# Patient Record
Sex: FEMALE | Race: BLACK OR AFRICAN AMERICAN | NOT HISPANIC OR LATINO | URBAN - METROPOLITAN AREA
[De-identification: names, ages, dates, MRNs, and addresses within clinical notes are randomized per-mention and may not be internally consistent; named-entity substitution may affect disease eponyms.]

---

## 2017-05-01 ENCOUNTER — EMERGENCY (EMERGENCY)
Facility: HOSPITAL | Age: 57
LOS: 1 days | Discharge: HOME | End: 2017-05-01
Attending: EMERGENCY MEDICINE | Admitting: EMERGENCY MEDICINE
Payer: COMMERCIAL

## 2017-05-01 VITALS
TEMPERATURE: 99 F | DIASTOLIC BLOOD PRESSURE: 81 MMHG | HEIGHT: 62 IN | HEART RATE: 109 BPM | OXYGEN SATURATION: 100 % | SYSTOLIC BLOOD PRESSURE: 147 MMHG | RESPIRATION RATE: 20 BRPM

## 2017-05-01 VITALS — TEMPERATURE: 99 F | HEART RATE: 90 BPM

## 2017-05-01 DIAGNOSIS — Z79.899 OTHER LONG TERM (CURRENT) DRUG THERAPY: ICD-10-CM

## 2017-05-01 DIAGNOSIS — M25.511 PAIN IN RIGHT SHOULDER: ICD-10-CM

## 2017-05-01 DIAGNOSIS — I10 ESSENTIAL (PRIMARY) HYPERTENSION: ICD-10-CM

## 2017-05-01 PROCEDURE — 99283 EMERGENCY DEPT VISIT LOW MDM: CPT

## 2017-05-01 RX ORDER — DEXAMETHASONE 0.5 MG/5ML
10 ELIXIR ORAL ONCE
Qty: 0 | Refills: 0 | Status: COMPLETED | OUTPATIENT
Start: 2017-05-01 | End: 2017-05-01

## 2017-05-01 RX ORDER — ACETAMINOPHEN 500 MG
975 TABLET ORAL ONCE
Qty: 0 | Refills: 0 | Status: COMPLETED | OUTPATIENT
Start: 2017-05-01 | End: 2017-05-02

## 2017-05-01 RX ORDER — IBUPROFEN 200 MG
400 TABLET ORAL ONCE
Qty: 0 | Refills: 0 | Status: COMPLETED | OUTPATIENT
Start: 2017-05-01 | End: 2017-05-01

## 2017-05-01 NOTE — ED ADULT NURSE NOTE - OBJECTIVE STATEMENT
56 yr old female coming in with right shoulder pain x1 month. Patient has full rom but has discomfort in the shoulder. No visible deformities. No numbness/tingling of arm/hands. Strong equal hand grasp.

## 2017-05-02 PROCEDURE — 99283 EMERGENCY DEPT VISIT LOW MDM: CPT

## 2017-05-02 RX ADMIN — Medication 10 MILLIGRAM(S): at 00:09

## 2017-05-02 RX ADMIN — Medication 975 MILLIGRAM(S): at 00:10

## 2017-05-02 RX ADMIN — Medication 400 MILLIGRAM(S): at 00:09

## 2017-05-02 NOTE — ED PROVIDER NOTE - PROGRESS NOTE DETAILS
Patients symptoms have improved. In bed and comfortable. Lengthy discussion regarding treatment, discharge instructions, and need for follow up. Patient understands and wishes to go home. PARAG Thapa MD

## 2017-05-02 NOTE — ED PROVIDER NOTE - MEDICAL DECISION MAKING DETAILS
56 year old F h/o L shoulder "problems" and herniated cervical discs presenting with R shoulder pain x 1 mo worse over last few days at work lifting and moving boxes. "I over used it."   Has taken 1 naproxen for pain, with minimal relief. no numbness or weakness.  likely rotator cuff or shoulder impingement injury. plan for NSAIDs, steroids, tylenol and out patient f/u.

## 2017-05-02 NOTE — ED PROVIDER NOTE - PHYSICAL EXAMINATION
Well Appearing, Nontoxic, NAD;  Symm Facies, PERRL 3mm, (-)Pallor, Anicteric, MMM;   Abd soft, nt/nd, +bs;  No CVAT;  No edema/calf tender;  No rash;  AOX3, Normal speech  limited range of motion of R shoulder - unable raise shoulder above head. mild tender to palpation anterior shoulder. soft compartments, neurovascularly intact. Good distal pulses.

## 2018-01-11 ENCOUNTER — HOSPITAL ENCOUNTER (EMERGENCY)
Facility: HOSPITAL | Age: 58
Discharge: HOME | End: 2018-01-11
Attending: EMERGENCY MEDICINE

## 2018-01-11 VITALS
SYSTOLIC BLOOD PRESSURE: 136 MMHG | OXYGEN SATURATION: 100 % | TEMPERATURE: 98 F | HEIGHT: 66 IN | RESPIRATION RATE: 16 BRPM | DIASTOLIC BLOOD PRESSURE: 68 MMHG | HEART RATE: 66 BPM | WEIGHT: 150 LBS | BODY MASS INDEX: 24.11 KG/M2

## 2018-01-11 DIAGNOSIS — K44.9 HIATAL HERNIA: Primary | ICD-10-CM

## 2018-01-11 DIAGNOSIS — K21.9 GASTROESOPHAGEAL REFLUX DISEASE WITHOUT ESOPHAGITIS: ICD-10-CM

## 2018-01-11 LAB
ALBUMIN SERPL BCP-MCNC: 3.5 G/DL
ALP SERPL-CCNC: 142 U/L
ALT SERPL W/O P-5'-P-CCNC: 12 U/L
AMORPH CRY URNS QL MICRO: ABNORMAL
ANION GAP SERPL CALC-SCNC: 8 MMOL/L
AST SERPL-CCNC: 13 U/L
B-HCG UR QL: NEGATIVE
BACTERIA #/AREA URNS HPF: ABNORMAL /HPF
BASOPHILS # BLD AUTO: 0.05 K/UL
BASOPHILS NFR BLD: 0.5 %
BILIRUB SERPL-MCNC: 0.3 MG/DL
BILIRUB UR QL STRIP: NEGATIVE
BUN SERPL-MCNC: 19 MG/DL
CALCIUM SERPL-MCNC: 9.2 MG/DL
CHLORIDE SERPL-SCNC: 109 MMOL/L
CLARITY UR: ABNORMAL
CO2 SERPL-SCNC: 25 MMOL/L
COLOR UR: YELLOW
CREAT SERPL-MCNC: 0.8 MG/DL
CTP QC/QA: YES
DIFFERENTIAL METHOD: ABNORMAL
EOSINOPHIL # BLD AUTO: 0.5 K/UL
EOSINOPHIL NFR BLD: 4.5 %
ERYTHROCYTE [DISTWIDTH] IN BLOOD BY AUTOMATED COUNT: 13.5 %
EST. GFR  (AFRICAN AMERICAN): >60 ML/MIN/1.73 M^2
EST. GFR  (NON AFRICAN AMERICAN): >60 ML/MIN/1.73 M^2
GLUCOSE SERPL-MCNC: 109 MG/DL
GLUCOSE UR QL STRIP: NEGATIVE
HCT VFR BLD AUTO: 35 %
HGB BLD-MCNC: 11.7 G/DL
HGB UR QL STRIP: NEGATIVE
KETONES UR QL STRIP: NEGATIVE
LEUKOCYTE ESTERASE UR QL STRIP: ABNORMAL
LIPASE SERPL-CCNC: 40 U/L
LYMPHOCYTES # BLD AUTO: 3.1 K/UL
LYMPHOCYTES NFR BLD: 30.8 %
MCH RBC QN AUTO: 32 PG
MCHC RBC AUTO-ENTMCNC: 33.4 G/DL
MCV RBC AUTO: 96 FL
MICROSCOPIC COMMENT: ABNORMAL
MONOCYTES # BLD AUTO: 0.9 K/UL
MONOCYTES NFR BLD: 9.2 %
NEUTROPHILS # BLD AUTO: 5.5 K/UL
NEUTROPHILS NFR BLD: 55 %
NITRITE UR QL STRIP: NEGATIVE
PH UR STRIP: 5 [PH] (ref 5–8)
PLATELET # BLD AUTO: 315 K/UL
PMV BLD AUTO: 9.5 FL
POTASSIUM SERPL-SCNC: 3.7 MMOL/L
PROT SERPL-MCNC: 7.3 G/DL
PROT UR QL STRIP: NEGATIVE
RBC # BLD AUTO: 3.66 M/UL
RBC #/AREA URNS HPF: 4 /HPF (ref 0–4)
SODIUM SERPL-SCNC: 142 MMOL/L
SP GR UR STRIP: 1.02 (ref 1–1.03)
SQUAMOUS #/AREA URNS HPF: 20 /HPF
TROPONIN I SERPL DL<=0.01 NG/ML-MCNC: <0.006 NG/ML
URN SPEC COLLECT METH UR: ABNORMAL
UROBILINOGEN UR STRIP-ACNC: ABNORMAL EU/DL
WBC # BLD AUTO: 10.07 K/UL
WBC #/AREA URNS HPF: 3 /HPF (ref 0–5)

## 2018-01-11 PROCEDURE — 25000003 PHARM REV CODE 250: Performed by: EMERGENCY MEDICINE

## 2018-01-11 PROCEDURE — 81025 URINE PREGNANCY TEST: CPT | Performed by: EMERGENCY MEDICINE

## 2018-01-11 PROCEDURE — 96361 HYDRATE IV INFUSION ADD-ON: CPT

## 2018-01-11 PROCEDURE — 84484 ASSAY OF TROPONIN QUANT: CPT

## 2018-01-11 PROCEDURE — 25500020 PHARM REV CODE 255: Performed by: EMERGENCY MEDICINE

## 2018-01-11 PROCEDURE — 80053 COMPREHEN METABOLIC PANEL: CPT

## 2018-01-11 PROCEDURE — 96360 HYDRATION IV INFUSION INIT: CPT

## 2018-01-11 PROCEDURE — 81000 URINALYSIS NONAUTO W/SCOPE: CPT

## 2018-01-11 PROCEDURE — 83690 ASSAY OF LIPASE: CPT

## 2018-01-11 PROCEDURE — 85025 COMPLETE CBC W/AUTO DIFF WBC: CPT

## 2018-01-11 PROCEDURE — 99284 EMERGENCY DEPT VISIT MOD MDM: CPT | Mod: 25

## 2018-01-11 RX ORDER — MAG HYDROX/ALUMINUM HYD/SIMETH 200-200-20
15 SUSPENSION, ORAL (FINAL DOSE FORM) ORAL
Qty: 150 ML | Refills: 0 | Status: SHIPPED | OUTPATIENT
Start: 2018-01-11 | End: 2019-01-11

## 2018-01-11 RX ORDER — SODIUM CHLORIDE 9 MG/ML
1000 INJECTION, SOLUTION INTRAVENOUS
Status: COMPLETED | OUTPATIENT
Start: 2018-01-11 | End: 2018-01-11

## 2018-01-11 RX ADMIN — IOHEXOL 75 ML: 350 INJECTION, SOLUTION INTRAVENOUS at 08:01

## 2018-01-11 RX ADMIN — SODIUM CHLORIDE 1000 ML: 0.9 INJECTION, SOLUTION INTRAVENOUS at 07:01

## 2018-01-11 NOTE — DISCHARGE INSTRUCTIONS
Avoid spicy foods.  Use medication as prescribed.  It is very important to make an appointment to see your primary physician as soon as possible.

## 2018-01-11 NOTE — ED NOTES
Explained to patient reason for delay as no beds available; verbalized understanding; gave our apologizes

## 2018-01-11 NOTE — ED PROVIDER NOTES
"Encounter Date: 1/11/2018    SCRIBE #1 NOTE: I, Parminder Radha, am scribing for, and in the presence of,  Luis E Gimenez MD. I have scribed the following portions of the note - Other sections scribed: HPI and ROS.       History     Chief Complaint   Patient presents with    Abdominal Pain     Pt reports "I have a knot in my stomach" that started today.       Chief Complaint: Abdominal Pain    HPI: This 57 y.o. Female with HTN presents to the ED c/o acute onset supra umbilical abdominal pain. Symptoms began last night immediately after consuming "spicy" chicken while at work. Patient reports an associated "knot" noticed region abdomen as well. Pain was severe initially and radiated to the right side abdomen. However, she states pain has since resolved. There's no attempted treatment. Last BM was yesterday morning. At this time, she denies fever, chills, nausea, vomiting, diarrhea, blood in stool or dysuria.       The history is provided by the patient. No  was used.     Review of patient's allergies indicates:  No Known Allergies  Past Medical History:   Diagnosis Date    Constipation     Hypertension      Past Surgical History:   Procedure Laterality Date    TUBAL LIGATION       History reviewed. No pertinent family history.  Social History   Substance Use Topics    Smoking status: Current Every Day Smoker     Packs/day: 0.50     Years: 20.00     Types: Cigarettes    Smokeless tobacco: Never Used    Alcohol use Yes      Comment: 4 cans beer a day X20 yrs     Review of Systems   Constitutional: Negative for chills and fever.   HENT: Negative for ear pain and sore throat.    Eyes: Negative for pain.   Respiratory: Negative for cough and shortness of breath.    Cardiovascular: Negative for chest pain.   Gastrointestinal: Positive for abdominal pain. Negative for diarrhea, nausea and vomiting.   Genitourinary: Negative for dysuria and hematuria.   Musculoskeletal: Negative for myalgias (arm or " leg pain).   Skin: Negative for rash.   Neurological: Negative for headaches.       Physical Exam     Initial Vitals [01/11/18 0044]   BP Pulse Resp Temp SpO2   (!) 149/76 62 18 97.5 °F (36.4 °C) 99 %      MAP       100.33         Physical Exam    Nursing note and vitals reviewed.  Constitutional: She appears well-developed and well-nourished.  Non-toxic appearance. She does not appear ill.   HENT:   Head: Normocephalic and atraumatic.   Eyes: EOM are normal.   Neck: Neck supple.   Cardiovascular: Normal rate and regular rhythm.   Pulmonary/Chest: Effort normal and breath sounds normal. No respiratory distress.   Abdominal: Soft. Normal appearance and bowel sounds are normal. She exhibits no distension. There is tenderness in the right upper quadrant and right lower quadrant. There is no rebound and no guarding.   Right sided abdominal tenderness to palpation.     There's a soft non tenderness easily reducible supra umbilical hernia.    Musculoskeletal: Normal range of motion.   Neurological: She is alert.   Skin: Skin is warm and dry.   Psychiatric: She has a normal mood and affect.         ED Course   Procedures  Labs Reviewed   CBC W/ AUTO DIFFERENTIAL - Abnormal; Notable for the following:        Result Value    RBC 3.66 (*)     Hemoglobin 11.7 (*)     Hematocrit 35.0 (*)     MCH 32.0 (*)     All other components within normal limits   COMPREHENSIVE METABOLIC PANEL - Abnormal; Notable for the following:     Alkaline Phosphatase 142 (*)     All other components within normal limits   URINALYSIS - Abnormal; Notable for the following:     Appearance, UA Hazy (*)     Urobilinogen, UA 2.0-3.0 (*)     Leukocytes, UA Trace (*)     All other components within normal limits   URINALYSIS MICROSCOPIC - Abnormal; Notable for the following:     Bacteria, UA Moderate (*)     All other components within normal limits   LIPASE   TROPONIN I   POCT URINE PREGNANCY             Medical Decision Making:   History:   Old Medical  Records: I decided to obtain old medical records.  Initial Assessment:   Abdominal pain  Differential Diagnosis:   GERD  Gastritis  Appendicits  Cholecystits  Diverticultiis   Clinical Tests:   Lab Tests: Ordered and Reviewed  Radiological Study: Ordered and Reviewed  ED Management:  Patient afebrile, no acute distress.  At time of H&P patient reports abdominal pain had resolved.  On physical exam she did have some right sided tenderness and a soft easily reducible supra umbilical hernia.  CT scan negative for appendicitis.  Does demonstrate fibroid uterus, nonspecific liver enlargement and hiatal hernia.  No evidence of appendicitis.  Patient reports symptoms resolved in the emergency room.  Started on Zantac and Maalox and referred to PMD. Counseled on supportive care and appropriate medication usage. Dicussed extensively concerning symptoms for which to return to ER and the importance of follow up . Patient expressed understanding and agreement with treatment plan.             Scribe Attestation:   Scribe #1: I performed the above scribed service and the documentation accurately describes the services I performed. I attest to the accuracy of the note.    Attending Attestation:           Physician Attestation for Scribe:  Physician Attestation Statement for Scribe #1: I, Luis E Gimenez MD, reviewed documentation, as scribed by Parminder Garcia in my presence, and it is both accurate and complete.                 ED Course      Clinical Impression:   The primary encounter diagnosis was Hiatal hernia. A diagnosis of Gastroesophageal reflux disease without esophagitis was also pertinent to this visit.    Disposition:   Disposition: Discharged  Condition: Stable                        Luis E Gimenez MD  01/12/18 2992

## 2020-03-30 ENCOUNTER — INPATIENT (INPATIENT)
Facility: HOSPITAL | Age: 60
LOS: 3 days | Discharge: HOME | End: 2020-04-03
Attending: INTERNAL MEDICINE | Admitting: INTERNAL MEDICINE
Payer: COMMERCIAL

## 2020-03-30 VITALS
SYSTOLIC BLOOD PRESSURE: 125 MMHG | HEART RATE: 115 BPM | HEIGHT: 62 IN | OXYGEN SATURATION: 84 % | WEIGHT: 214.95 LBS | DIASTOLIC BLOOD PRESSURE: 61 MMHG | TEMPERATURE: 100 F | RESPIRATION RATE: 21 BRPM

## 2020-03-30 PROBLEM — I10 ESSENTIAL (PRIMARY) HYPERTENSION: Chronic | Status: ACTIVE | Noted: 2017-05-01

## 2020-03-30 PROBLEM — R73.03 PREDIABETES: Chronic | Status: ACTIVE | Noted: 2017-05-01

## 2020-03-30 LAB
ALBUMIN SERPL ELPH-MCNC: 3.3 G/DL — SIGNIFICANT CHANGE UP (ref 3.3–5)
ALP SERPL-CCNC: 105 U/L — SIGNIFICANT CHANGE UP (ref 40–120)
ALT FLD-CCNC: 29 U/L — SIGNIFICANT CHANGE UP (ref 12–78)
ANION GAP SERPL CALC-SCNC: 5 MMOL/L — SIGNIFICANT CHANGE UP (ref 5–17)
APPEARANCE UR: CLEAR — SIGNIFICANT CHANGE UP
APTT BLD: 36.2 SEC — SIGNIFICANT CHANGE UP (ref 28.5–37)
AST SERPL-CCNC: 36 U/L — SIGNIFICANT CHANGE UP (ref 15–37)
BACTERIA # UR AUTO: ABNORMAL
BASOPHILS # BLD AUTO: 0.01 K/UL — SIGNIFICANT CHANGE UP (ref 0–0.2)
BASOPHILS NFR BLD AUTO: 0.2 % — SIGNIFICANT CHANGE UP (ref 0–2)
BILIRUB SERPL-MCNC: 0.4 MG/DL — SIGNIFICANT CHANGE UP (ref 0.2–1.2)
BILIRUB UR-MCNC: NEGATIVE — SIGNIFICANT CHANGE UP
BUN SERPL-MCNC: 9 MG/DL — SIGNIFICANT CHANGE UP (ref 7–23)
CALCIUM SERPL-MCNC: 8.7 MG/DL — SIGNIFICANT CHANGE UP (ref 8.5–10.1)
CHLORIDE SERPL-SCNC: 99 MMOL/L — SIGNIFICANT CHANGE UP (ref 96–108)
CO2 SERPL-SCNC: 30 MMOL/L — SIGNIFICANT CHANGE UP (ref 22–31)
COLOR SPEC: YELLOW — SIGNIFICANT CHANGE UP
CREAT SERPL-MCNC: 1.04 MG/DL — SIGNIFICANT CHANGE UP (ref 0.5–1.3)
DIFF PNL FLD: ABNORMAL
EOSINOPHIL # BLD AUTO: 0 K/UL — SIGNIFICANT CHANGE UP (ref 0–0.5)
EOSINOPHIL NFR BLD AUTO: 0 % — SIGNIFICANT CHANGE UP (ref 0–6)
EPI CELLS # UR: ABNORMAL
FLU A RESULT: SIGNIFICANT CHANGE UP
FLU A RESULT: SIGNIFICANT CHANGE UP
FLUAV AG NPH QL: SIGNIFICANT CHANGE UP
FLUBV AG NPH QL: SIGNIFICANT CHANGE UP
GLUCOSE SERPL-MCNC: 155 MG/DL — HIGH (ref 70–99)
GLUCOSE UR QL: NEGATIVE MG/DL — SIGNIFICANT CHANGE UP
HCT VFR BLD CALC: 39.7 % — SIGNIFICANT CHANGE UP (ref 34.5–45)
HGB BLD-MCNC: 12.7 G/DL — SIGNIFICANT CHANGE UP (ref 11.5–15.5)
IMM GRANULOCYTES NFR BLD AUTO: 0.6 % — SIGNIFICANT CHANGE UP (ref 0–1.5)
INR BLD: 1.04 RATIO — SIGNIFICANT CHANGE UP (ref 0.88–1.16)
KETONES UR-MCNC: NEGATIVE — SIGNIFICANT CHANGE UP
LEUKOCYTE ESTERASE UR-ACNC: ABNORMAL
LYMPHOCYTES # BLD AUTO: 1.42 K/UL — SIGNIFICANT CHANGE UP (ref 1–3.3)
LYMPHOCYTES # BLD AUTO: 28.5 % — SIGNIFICANT CHANGE UP (ref 13–44)
MCHC RBC-ENTMCNC: 27.9 PG — SIGNIFICANT CHANGE UP (ref 27–34)
MCHC RBC-ENTMCNC: 32 GM/DL — SIGNIFICANT CHANGE UP (ref 32–36)
MCV RBC AUTO: 87.3 FL — SIGNIFICANT CHANGE UP (ref 80–100)
MONOCYTES # BLD AUTO: 0.44 K/UL — SIGNIFICANT CHANGE UP (ref 0–0.9)
MONOCYTES NFR BLD AUTO: 8.8 % — SIGNIFICANT CHANGE UP (ref 2–14)
NEUTROPHILS # BLD AUTO: 3.09 K/UL — SIGNIFICANT CHANGE UP (ref 1.8–7.4)
NEUTROPHILS NFR BLD AUTO: 61.9 % — SIGNIFICANT CHANGE UP (ref 43–77)
NITRITE UR-MCNC: NEGATIVE — SIGNIFICANT CHANGE UP
NRBC # BLD: 0 /100 WBCS — SIGNIFICANT CHANGE UP (ref 0–0)
PH UR: 6 — SIGNIFICANT CHANGE UP (ref 5–8)
PLATELET # BLD AUTO: 263 K/UL — SIGNIFICANT CHANGE UP (ref 150–400)
POTASSIUM SERPL-MCNC: 4.2 MMOL/L — SIGNIFICANT CHANGE UP (ref 3.5–5.3)
POTASSIUM SERPL-SCNC: 4.2 MMOL/L — SIGNIFICANT CHANGE UP (ref 3.5–5.3)
PROT SERPL-MCNC: 8.2 GM/DL — SIGNIFICANT CHANGE UP (ref 6–8.3)
PROT UR-MCNC: 15 MG/DL
PROTHROM AB SERPL-ACNC: 11.7 SEC — SIGNIFICANT CHANGE UP (ref 10–12.9)
RBC # BLD: 4.55 M/UL — SIGNIFICANT CHANGE UP (ref 3.8–5.2)
RBC # FLD: 13.8 % — SIGNIFICANT CHANGE UP (ref 10.3–14.5)
RBC CASTS # UR COMP ASSIST: SIGNIFICANT CHANGE UP /HPF (ref 0–4)
RSV RESULT: SIGNIFICANT CHANGE UP
RSV RNA RESP QL NAA+PROBE: SIGNIFICANT CHANGE UP
SODIUM SERPL-SCNC: 134 MMOL/L — LOW (ref 135–145)
SP GR SPEC: 1.01 — SIGNIFICANT CHANGE UP (ref 1.01–1.02)
UROBILINOGEN FLD QL: NEGATIVE MG/DL — SIGNIFICANT CHANGE UP
WBC # BLD: 4.99 K/UL — SIGNIFICANT CHANGE UP (ref 3.8–10.5)
WBC # FLD AUTO: 4.99 K/UL — SIGNIFICANT CHANGE UP (ref 3.8–10.5)
WBC UR QL: >50

## 2020-03-30 PROCEDURE — 99223 1ST HOSP IP/OBS HIGH 75: CPT

## 2020-03-30 PROCEDURE — 93010 ELECTROCARDIOGRAM REPORT: CPT

## 2020-03-30 PROCEDURE — 99284 EMERGENCY DEPT VISIT MOD MDM: CPT

## 2020-03-30 PROCEDURE — 71045 X-RAY EXAM CHEST 1 VIEW: CPT | Mod: 26

## 2020-03-30 RX ORDER — ENOXAPARIN SODIUM 100 MG/ML
40 INJECTION SUBCUTANEOUS ONCE
Refills: 0 | Status: COMPLETED | OUTPATIENT
Start: 2020-03-30 | End: 2020-03-30

## 2020-03-30 RX ORDER — HYDROXYCHLOROQUINE SULFATE 200 MG
TABLET ORAL
Refills: 0 | Status: DISCONTINUED | OUTPATIENT
Start: 2020-03-30 | End: 2020-04-03

## 2020-03-30 RX ORDER — AZITHROMYCIN 500 MG/1
500 TABLET, FILM COATED ORAL ONCE
Refills: 0 | Status: COMPLETED | OUTPATIENT
Start: 2020-03-30 | End: 2020-03-30

## 2020-03-30 RX ORDER — HYDROXYCHLOROQUINE SULFATE 200 MG
400 TABLET ORAL EVERY 12 HOURS
Refills: 0 | Status: COMPLETED | OUTPATIENT
Start: 2020-03-30 | End: 2020-03-31

## 2020-03-30 RX ORDER — ENOXAPARIN SODIUM 100 MG/ML
100 INJECTION SUBCUTANEOUS ONCE
Refills: 0 | Status: DISCONTINUED | OUTPATIENT
Start: 2020-03-30 | End: 2020-03-30

## 2020-03-30 RX ORDER — AMLODIPINE BESYLATE 2.5 MG/1
5 TABLET ORAL DAILY
Refills: 0 | Status: DISCONTINUED | OUTPATIENT
Start: 2020-03-30 | End: 2020-04-03

## 2020-03-30 RX ORDER — ALBUTEROL 90 UG/1
2 AEROSOL, METERED ORAL ONCE
Refills: 0 | Status: COMPLETED | OUTPATIENT
Start: 2020-03-30 | End: 2020-03-30

## 2020-03-30 RX ORDER — HYDROXYCHLOROQUINE SULFATE 200 MG
200 TABLET ORAL EVERY 12 HOURS
Refills: 0 | Status: DISCONTINUED | OUTPATIENT
Start: 2020-03-31 | End: 2020-04-03

## 2020-03-30 RX ADMIN — ALBUTEROL 2 PUFF(S): 90 AEROSOL, METERED ORAL at 14:02

## 2020-03-30 RX ADMIN — Medication 400 MILLIGRAM(S): at 15:33

## 2020-03-30 RX ADMIN — AZITHROMYCIN 500 MILLIGRAM(S): 500 TABLET, FILM COATED ORAL at 15:32

## 2020-03-30 RX ADMIN — Medication 80 MILLIGRAM(S): at 15:32

## 2020-03-30 RX ADMIN — ENOXAPARIN SODIUM 40 MILLIGRAM(S): 100 INJECTION SUBCUTANEOUS at 15:32

## 2020-03-30 NOTE — ED ADULT TRIAGE NOTE - CHIEF COMPLAINT QUOTE
patient c/o of fever, difficulty breathing started and cough last week , c/o of back pain denied chest pain , denied abdominal pain no N/V no diarrhea at the time of triage

## 2020-03-30 NOTE — H&P ADULT - ASSESSMENT
59 years old female wit h probable covid         IMPROVE VTE Individual Risk Assessment    RISK                                                          Points  [] Previous VTE                                           3  [] Thrombophilia                                        2  [] Lower limb paralysis                              2   [] Current Cancer                                       2   [] Immobilization > 24 hrs                        1  [] ICU/CCU stay > 24 hours                       1  [] Age > 60                                                   1    IMPROVE VTE Score:0

## 2020-03-30 NOTE — H&P ADULT - NSHPPHYSICALEXAM_GEN_ALL_CORE
ICU Vital Signs Last 24 Hrs  T(C): 37.5 (30 Mar 2020 12:29), Max: 37.5 (30 Mar 2020 12:29)  T(F): 99.5 (30 Mar 2020 12:29), Max: 99.5 (30 Mar 2020 12:29)  HR: 96 (30 Mar 2020 14:15) (96 - 115)  BP: 125/61 (30 Mar 2020 12:29) (125/61 - 125/61)  BP(mean): --  ABP: --  ABP(mean): --  RR: 17 (30 Mar 2020 14:15) (17 - 21)  SpO2: 97% (30 Mar 2020 14:15) (84% - 97%)  GENERAL: NAD well-developed  HEAD:  Atraumatic, Normocephalic  EYES: EOMI, PERRLA, conjunctiva and sclera clear  ENMT: No tonsillar erythema, exudates, or enlargement; Moist mucous membranes, Good dentition, No lesions  NECK: Supple, No JVD, Normal thyroid  NERVOUS SYSTEM:  Alert & Oriented X3, Good concentration; Motor Strength 5/5 B/L upper and lower extremities; DTRs 2+ intact and symmetric  CHEST/LUNG: Clear to percussion bilaterally; No rales, rhonchi, wheezing, or rubs  HEART: Regular rate and rhythm; No murmurs, rubs, or gallops  ABDOMEN: Soft, Nontender, Nondistended; Bowel sounds present  EXTREMITIES:  2+ Peripheral Pulses, No clubbing, cyanosis, or edema  LYMPH: No lymphadenopathy   SKIN: No rashes or lesions

## 2020-03-30 NOTE — ED PROVIDER NOTE - OBJECTIVE STATEMENT
59 year old female pmh htn presents with sob fever body aches for 1 week. worsened over past day. sating 84% on RA. improved on NC

## 2020-03-31 DIAGNOSIS — B97.29 OTHER CORONAVIRUS AS THE CAUSE OF DISEASES CLASSIFIED ELSEWHERE: ICD-10-CM

## 2020-03-31 DIAGNOSIS — J12.9 VIRAL PNEUMONIA, UNSPECIFIED: ICD-10-CM

## 2020-03-31 DIAGNOSIS — I10 ESSENTIAL (PRIMARY) HYPERTENSION: ICD-10-CM

## 2020-03-31 LAB
CULTURE RESULTS: SIGNIFICANT CHANGE UP
SPECIMEN SOURCE: SIGNIFICANT CHANGE UP

## 2020-03-31 PROCEDURE — 99232 SBSQ HOSP IP/OBS MODERATE 35: CPT

## 2020-03-31 RX ORDER — ASCORBIC ACID 60 MG
500 TABLET,CHEWABLE ORAL DAILY
Refills: 0 | Status: DISCONTINUED | OUTPATIENT
Start: 2020-03-31 | End: 2020-04-03

## 2020-03-31 RX ORDER — ATORVASTATIN CALCIUM 80 MG/1
10 TABLET, FILM COATED ORAL AT BEDTIME
Refills: 0 | Status: DISCONTINUED | OUTPATIENT
Start: 2020-03-31 | End: 2020-04-03

## 2020-03-31 RX ORDER — DEXTROSE 50 % IN WATER 50 %
25 SYRINGE (ML) INTRAVENOUS ONCE
Refills: 0 | Status: DISCONTINUED | OUTPATIENT
Start: 2020-03-31 | End: 2020-04-03

## 2020-03-31 RX ORDER — ATAZANAVIR 200 MG/1
300 CAPSULE ORAL DAILY
Refills: 0 | Status: DISCONTINUED | OUTPATIENT
Start: 2020-03-31 | End: 2020-03-31

## 2020-03-31 RX ORDER — DEXTROSE 50 % IN WATER 50 %
15 SYRINGE (ML) INTRAVENOUS ONCE
Refills: 0 | Status: DISCONTINUED | OUTPATIENT
Start: 2020-03-31 | End: 2020-04-03

## 2020-03-31 RX ORDER — SODIUM CHLORIDE 9 MG/ML
1000 INJECTION, SOLUTION INTRAVENOUS
Refills: 0 | Status: DISCONTINUED | OUTPATIENT
Start: 2020-03-31 | End: 2020-04-03

## 2020-03-31 RX ORDER — DEXTROSE 50 % IN WATER 50 %
12.5 SYRINGE (ML) INTRAVENOUS ONCE
Refills: 0 | Status: DISCONTINUED | OUTPATIENT
Start: 2020-03-31 | End: 2020-04-03

## 2020-03-31 RX ORDER — ZINC SULFATE TAB 220 MG (50 MG ZINC EQUIVALENT) 220 (50 ZN) MG
220 TAB ORAL DAILY
Refills: 0 | Status: DISCONTINUED | OUTPATIENT
Start: 2020-03-31 | End: 2020-04-03

## 2020-03-31 RX ORDER — AMLODIPINE BESYLATE 2.5 MG/1
0 TABLET ORAL
Qty: 0 | Refills: 0 | DISCHARGE

## 2020-03-31 RX ORDER — GLUCAGON INJECTION, SOLUTION 0.5 MG/.1ML
1 INJECTION, SOLUTION SUBCUTANEOUS ONCE
Refills: 0 | Status: DISCONTINUED | OUTPATIENT
Start: 2020-03-31 | End: 2020-04-03

## 2020-03-31 RX ORDER — ENOXAPARIN SODIUM 100 MG/ML
40 INJECTION SUBCUTANEOUS DAILY
Refills: 0 | Status: DISCONTINUED | OUTPATIENT
Start: 2020-03-31 | End: 2020-04-01

## 2020-03-31 RX ORDER — ATAZANAVIR 200 MG/1
300 CAPSULE ORAL DAILY
Refills: 0 | Status: DISCONTINUED | OUTPATIENT
Start: 2020-03-31 | End: 2020-04-03

## 2020-03-31 RX ORDER — INSULIN LISPRO 100/ML
VIAL (ML) SUBCUTANEOUS
Refills: 0 | Status: DISCONTINUED | OUTPATIENT
Start: 2020-03-31 | End: 2020-04-03

## 2020-03-31 RX ADMIN — Medication 200 MILLIGRAM(S): at 15:56

## 2020-03-31 RX ADMIN — Medication 400 MILLIGRAM(S): at 05:35

## 2020-03-31 RX ADMIN — ATAZANAVIR 300 MILLIGRAM(S): 200 CAPSULE ORAL at 12:01

## 2020-03-31 RX ADMIN — Medication 500 MILLIGRAM(S): at 12:01

## 2020-03-31 RX ADMIN — ZINC SULFATE TAB 220 MG (50 MG ZINC EQUIVALENT) 220 MILLIGRAM(S): 220 (50 ZN) TAB at 12:01

## 2020-03-31 NOTE — PROGRESS NOTE ADULT - ASSESSMENT
59 years old female wit history of HTN, HLD & DM II who p/w fever, body aches & hypoxia.   - patient likely has COVID, test pending  - patient sating 84% on RA. improved on NC  - flu & RSV negative  - CXR showed nonspecific mild increased interstitial lung markings with airspace opacity right lung base  - patient started on prophylactic Plaquenil and Reyataz  - NGTD on cultures    Essential hypertension  - c/w Norvasc and enalapril     DM II  - start ISS    HLD  - c/w Lipitor & enalapril     Prophylaxis:  DVT: Lovenox  GI: PO diet

## 2020-03-31 NOTE — PROGRESS NOTE ADULT - SUBJECTIVE AND OBJECTIVE BOX
59 years old female wit history of HTN, HLD & DM II who p/w fever, body aches & hypoxia. She is lying in bed in NAD.    MEDICATIONS  (STANDING):  amLODIPine   Tablet 5 milliGRAM(s) Oral daily  ascorbic acid 500 milliGRAM(s) Oral daily  atazanavir 300 milliGRAM(s) Oral daily  enalapril 5 milliGRAM(s) Oral daily  hydroxychloroquine 200 milliGRAM(s) Oral every 12 hours  hydroxychloroquine   Oral   zinc sulfate 220 milliGRAM(s) Oral daily    MEDICATIONS  (PRN):    Allergies    No Known Allergies    Intolerances    Vital Signs Last 24 Hrs  T(C): 37.1 (31 Mar 2020 17:10), Max: 37.1 (31 Mar 2020 17:10)  T(F): 98.8 (31 Mar 2020 17:10), Max: 98.8 (31 Mar 2020 17:10)  HR: 93 (31 Mar 2020 17:10) (85 - 98)  BP: 105/54 (31 Mar 2020 17:10) (105/54 - 132/70)  RR: 16 (31 Mar 2020 17:10) (16 - 18)  SpO2: 95% (31 Mar 2020 17:10) (93% - 98%)    PHYSICAL EXAM:  GENERAL: NAD, well-groomed, well-developed  HEAD:  Atraumatic, Normocephalic  EYES: EOMI, PERRLA   NECK: Supple   NERVOUS SYSTEM:  Alert   CHEST/LUNG: Clear to auscultation bilaterally; No rales, rhonchi, wheezing, or rubs  HEART: Regular rate and rhythm; No murmurs, rubs, or gallops  ABDOMEN: Soft, Nontender, Nondistended; Bowel sounds present  EXTREMITIES: No clubbing, cyanosis, or edema    LABS:                        12.7   4.99  )-----------( 263      ( 30 Mar 2020 14:06 )             39.7     03-30    134<L>  |  99  |  9   ----------------------------<  155<H>  4.2   |  30  |  1.04    Ca    8.7      30 Mar 2020 14:06    TPro  8.2  /  Alb  3.3  /  TBili  0.4  /  DBili  x   /  AST  36  /  ALT  29  /  AlkPhos  105  03-30    PT/INR - ( 30 Mar 2020 14:06 )   PT: 11.7 sec;   INR: 1.04 ratio      PTT - ( 30 Mar 2020 14:06 )  PTT:36.2 sec  Urinalysis Basic - ( 30 Mar 2020 17:28 )    Color: x / Appearance: x / SG: x / pH: x  Gluc: x / Ketone: x  / Bili: x / Urobili: x   Blood: x / Protein: x / Nitrite: x   Leuk Esterase: x / RBC: 0-2 /HPF / WBC >50   Sq Epi: x / Non Sq Epi: Many / Bacteria: Many     Culture - Urine (collected 31 Mar 2020 00:33)  Source: .Urine Clean Catch (Midstream)  Final Report (31 Mar 2020 20:40):    <10,000 CFU/mL Normal Urogenital Radha    Culture - Blood (collected 30 Mar 2020 17:22)  Source: .Blood Blood-Peripheral  Preliminary Report (31 Mar 2020 18:02):    No growth to date.    Culture - Blood (collected 30 Mar 2020 17:00)  Source: .Blood Blood-Peripheral  Preliminary Report (31 Mar 2020 17:02):    No growth to date.

## 2020-04-01 LAB
ALBUMIN SERPL ELPH-MCNC: 2.7 G/DL — LOW (ref 3.3–5)
ALP SERPL-CCNC: 92 U/L — SIGNIFICANT CHANGE UP (ref 40–120)
ALT FLD-CCNC: 21 U/L — SIGNIFICANT CHANGE UP (ref 12–78)
ANION GAP SERPL CALC-SCNC: 7 MMOL/L — SIGNIFICANT CHANGE UP (ref 5–17)
AST SERPL-CCNC: 21 U/L — SIGNIFICANT CHANGE UP (ref 15–37)
BILIRUB SERPL-MCNC: 0.5 MG/DL — SIGNIFICANT CHANGE UP (ref 0.2–1.2)
BUN SERPL-MCNC: 15 MG/DL — SIGNIFICANT CHANGE UP (ref 7–23)
CALCIUM SERPL-MCNC: 8.2 MG/DL — LOW (ref 8.5–10.1)
CHLORIDE SERPL-SCNC: 102 MMOL/L — SIGNIFICANT CHANGE UP (ref 96–108)
CO2 SERPL-SCNC: 30 MMOL/L — SIGNIFICANT CHANGE UP (ref 22–31)
CREAT SERPL-MCNC: 0.88 MG/DL — SIGNIFICANT CHANGE UP (ref 0.5–1.3)
CRP SERPL-MCNC: 5.54 MG/DL — HIGH (ref 0–0.4)
D DIMER BLD IA.RAPID-MCNC: 188 NG/ML DDU — SIGNIFICANT CHANGE UP
ERYTHROCYTE [SEDIMENTATION RATE] IN BLOOD: 50 MM/HR — HIGH (ref 0–20)
GLUCOSE SERPL-MCNC: 139 MG/DL — HIGH (ref 70–99)
HBA1C BLD-MCNC: 7.1 % — HIGH (ref 4–5.6)
HCT VFR BLD CALC: 37.4 % — SIGNIFICANT CHANGE UP (ref 34.5–45)
HCV AB S/CO SERPL IA: 0.11 S/CO — SIGNIFICANT CHANGE UP (ref 0–0.99)
HCV AB SERPL-IMP: SIGNIFICANT CHANGE UP
HGB BLD-MCNC: 11.5 G/DL — SIGNIFICANT CHANGE UP (ref 11.5–15.5)
LDH SERPL L TO P-CCNC: 420 U/L — HIGH (ref 50–242)
MAGNESIUM SERPL-MCNC: 2.1 MG/DL — SIGNIFICANT CHANGE UP (ref 1.6–2.6)
MCHC RBC-ENTMCNC: 27.3 PG — SIGNIFICANT CHANGE UP (ref 27–34)
MCHC RBC-ENTMCNC: 30.7 GM/DL — LOW (ref 32–36)
MCV RBC AUTO: 88.6 FL — SIGNIFICANT CHANGE UP (ref 80–100)
NRBC # BLD: 0 /100 WBCS — SIGNIFICANT CHANGE UP (ref 0–0)
PHOSPHATE SERPL-MCNC: 2.4 MG/DL — LOW (ref 2.5–4.5)
PLATELET # BLD AUTO: 323 K/UL — SIGNIFICANT CHANGE UP (ref 150–400)
POTASSIUM SERPL-MCNC: 4.5 MMOL/L — SIGNIFICANT CHANGE UP (ref 3.5–5.3)
POTASSIUM SERPL-SCNC: 4.5 MMOL/L — SIGNIFICANT CHANGE UP (ref 3.5–5.3)
PROCALCITONIN SERPL-MCNC: 0.07 NG/ML — SIGNIFICANT CHANGE UP (ref 0.02–0.1)
PROT SERPL-MCNC: 6.9 GM/DL — SIGNIFICANT CHANGE UP (ref 6–8.3)
RBC # BLD: 4.22 M/UL — SIGNIFICANT CHANGE UP (ref 3.8–5.2)
RBC # FLD: 13.8 % — SIGNIFICANT CHANGE UP (ref 10.3–14.5)
SARS-COV-2 RNA SPEC QL NAA+PROBE: DETECTED
SODIUM SERPL-SCNC: 139 MMOL/L — SIGNIFICANT CHANGE UP (ref 135–145)
WBC # BLD: 8.79 K/UL — SIGNIFICANT CHANGE UP (ref 3.8–10.5)
WBC # FLD AUTO: 8.79 K/UL — SIGNIFICANT CHANGE UP (ref 3.8–10.5)

## 2020-04-01 PROCEDURE — 99232 SBSQ HOSP IP/OBS MODERATE 35: CPT

## 2020-04-01 RX ORDER — SODIUM,POTASSIUM PHOSPHATES 278-250MG
1 POWDER IN PACKET (EA) ORAL
Refills: 0 | Status: COMPLETED | OUTPATIENT
Start: 2020-04-01 | End: 2020-04-02

## 2020-04-01 RX ORDER — ENOXAPARIN SODIUM 100 MG/ML
40 INJECTION SUBCUTANEOUS EVERY 12 HOURS
Refills: 0 | Status: DISCONTINUED | OUTPATIENT
Start: 2020-04-01 | End: 2020-04-03

## 2020-04-01 RX ADMIN — Medication 1 PACKET(S): at 11:31

## 2020-04-01 RX ADMIN — ZINC SULFATE TAB 220 MG (50 MG ZINC EQUIVALENT) 220 MILLIGRAM(S): 220 (50 ZN) TAB at 11:30

## 2020-04-01 RX ADMIN — Medication 3: at 17:38

## 2020-04-01 RX ADMIN — Medication 500 MILLIGRAM(S): at 11:34

## 2020-04-01 RX ADMIN — ENOXAPARIN SODIUM 40 MILLIGRAM(S): 100 INJECTION SUBCUTANEOUS at 11:34

## 2020-04-01 RX ADMIN — Medication 3: at 11:34

## 2020-04-01 RX ADMIN — AMLODIPINE BESYLATE 5 MILLIGRAM(S): 2.5 TABLET ORAL at 06:40

## 2020-04-01 RX ADMIN — Medication 1 PACKET(S): at 17:38

## 2020-04-01 RX ADMIN — ATORVASTATIN CALCIUM 10 MILLIGRAM(S): 80 TABLET, FILM COATED ORAL at 22:21

## 2020-04-01 RX ADMIN — Medication 1 PACKET(S): at 22:21

## 2020-04-01 RX ADMIN — Medication 5 MILLIGRAM(S): at 06:40

## 2020-04-01 RX ADMIN — ATAZANAVIR 300 MILLIGRAM(S): 200 CAPSULE ORAL at 11:31

## 2020-04-01 RX ADMIN — Medication 200 MILLIGRAM(S): at 17:35

## 2020-04-01 RX ADMIN — ATORVASTATIN CALCIUM 10 MILLIGRAM(S): 80 TABLET, FILM COATED ORAL at 02:21

## 2020-04-01 RX ADMIN — Medication 200 MILLIGRAM(S): at 06:40

## 2020-04-01 NOTE — PROGRESS NOTE ADULT - ASSESSMENT
59 years old female wit history of HTN, HLD & DM II who p/w fever, body aches & hypoxia due sepsis POA from PNA due to COVID19.   - patient sating 84% on RA. improved on NC  - flu & RSV negative  - CXR showed nonspecific mild increased interstitial lung markings with airspace opacity right lung base  - c/w Plaquenil and Reyataz  - NGTD on cultures    Hypophosphatemia  - replace w/ PO Phos    Essential hypertension  - c/w Norvasc and enalapril     DM II  - c/w ISS  - Hgb A1C is 7.1    HLD  - c/w Lipitor     Prophylaxis:  DVT: Lovenox  GI: PO diet

## 2020-04-01 NOTE — PROGRESS NOTE ADULT - SUBJECTIVE AND OBJECTIVE BOX
59 years old female wit history of HTN, HLD & DM II who p/w fever, body aches & hypoxia. She is lying in bed in NAD.     MEDICATIONS  (STANDING):  amLODIPine   Tablet 5 milliGRAM(s) Oral daily  ascorbic acid 500 milliGRAM(s) Oral daily  atazanavir 300 milliGRAM(s) Oral daily  atorvastatin 10 milliGRAM(s) Oral at bedtime  dextrose 5%. 1000 milliLiter(s) (50 mL/Hr) IV Continuous <Continuous>  dextrose 50% Injectable 12.5 Gram(s) IV Push once  dextrose 50% Injectable 25 Gram(s) IV Push once  dextrose 50% Injectable 25 Gram(s) IV Push once  enalapril 5 milliGRAM(s) Oral daily  enoxaparin Injectable 40 milliGRAM(s) SubCutaneous daily  hydroxychloroquine 200 milliGRAM(s) Oral every 12 hours  hydroxychloroquine   Oral   insulin lispro (HumaLOG) corrective regimen sliding scale   SubCutaneous three times a day before meals  potassium phosphate / sodium phosphate powder 1 Packet(s) Oral four times a day with meals  zinc sulfate 220 milliGRAM(s) Oral daily    MEDICATIONS  (PRN):  dextrose 40% Gel 15 Gram(s) Oral once PRN Blood Glucose LESS THAN 70 milliGRAM(s)/deciliter  glucagon  Injectable 1 milliGRAM(s) IntraMuscular once PRN Glucose LESS THAN 70 milligrams/deciliter      Allergies    No Known Allergies    Intolerances        Vital Signs Last 24 Hrs  T(C): 36.7 (01 Apr 2020 11:00), Max: 37.9 (01 Apr 2020 05:54)  T(F): 98.1 (01 Apr 2020 11:00), Max: 100.3 (01 Apr 2020 05:54)  HR: 81 (01 Apr 2020 11:00) (81 - 92)  BP: 105/58 (01 Apr 2020 11:00) (105/58 - 126/61)  BP(mean): --  RR: 18 (01 Apr 2020 11:00) (18 - 18)  SpO2: 90% (01 Apr 2020 11:51) (90% - 99%)    PHYSICAL EXAM:  GENERAL: NAD, well-groomed, well-developed  HEAD:  Atraumatic, Normocephalic  EYES: EOMI, PERRLA   NECK: Supple   NERVOUS SYSTEM:  Alert   CHEST/LUNG: Clear to auscultation bilaterally; No rales, rhonchi, wheezing, or rubs  HEART: Regular rate and rhythm; No murmurs, rubs, or gallops  ABDOMEN: Soft, Nontender, Nondistended; Bowel sounds present  EXTREMITIES: No clubbing, cyanosis, or edema    LABS:                        11.5   8.79  )-----------( 323      ( 01 Apr 2020 07:54 )             37.4     04-01    139  |  102  |  15  ----------------------------<  139<H>  4.5   |  30  |  0.88    Ca    8.2<L>      01 Apr 2020 07:54  Phos  2.4     04-01  Mg     2.1     04-01    TPro  6.9  /  Alb  2.7<L>  /  TBili  0.5  /  DBili  x   /  AST  21  /  ALT  21  /  AlkPhos  92  04-01        CAPILLARY BLOOD GLUCOSE      POCT Blood Glucose.: 295 mg/dL (01 Apr 2020 17:36)  POCT Blood Glucose.: 298 mg/dL (01 Apr 2020 11:27)  POCT Blood Glucose.: 148 mg/dL (01 Apr 2020 08:12)  POCT Blood Glucose.: 128 mg/dL (31 Mar 2020 23:46)      Culture - Urine (collected 31 Mar 2020 00:33)  Source: .Urine Clean Catch (Midstream)  Final Report (31 Mar 2020 20:40):    <10,000 CFU/mL Normal Urogenital Radha    Culture - Blood (collected 30 Mar 2020 17:22)  Source: .Blood Blood-Peripheral  Preliminary Report (31 Mar 2020 18:02):    No growth to date.    Culture - Blood (collected 30 Mar 2020 17:00)  Source: .Blood Blood-Peripheral  Preliminary Report (31 Mar 2020 17:02):    No growth to date.      RADIOLOGY & ADDITIONAL TESTS:    04-01-20 @ 07:01  -  04-01-20 @ 18:40  --------------------------------------------------------  IN:    Oral Fluid: 240 mL  Total IN: 240 mL    OUT:  Total OUT: 0 mL    Total NET: 240 mL

## 2020-04-02 LAB
ANION GAP SERPL CALC-SCNC: 5 MMOL/L — SIGNIFICANT CHANGE UP (ref 5–17)
BUN SERPL-MCNC: 17 MG/DL — SIGNIFICANT CHANGE UP (ref 7–23)
CALCIUM SERPL-MCNC: 8.5 MG/DL — SIGNIFICANT CHANGE UP (ref 8.5–10.1)
CHLORIDE SERPL-SCNC: 101 MMOL/L — SIGNIFICANT CHANGE UP (ref 96–108)
CO2 SERPL-SCNC: 31 MMOL/L — SIGNIFICANT CHANGE UP (ref 22–31)
CREAT SERPL-MCNC: 0.94 MG/DL — SIGNIFICANT CHANGE UP (ref 0.5–1.3)
CRP SERPL-MCNC: 6.62 MG/DL — HIGH (ref 0–0.4)
ERYTHROCYTE [SEDIMENTATION RATE] IN BLOOD: 56 MM/HR — HIGH (ref 0–20)
GLUCOSE SERPL-MCNC: 173 MG/DL — HIGH (ref 70–99)
HCT VFR BLD CALC: 36 % — SIGNIFICANT CHANGE UP (ref 34.5–45)
HGB BLD-MCNC: 11.3 G/DL — LOW (ref 11.5–15.5)
LDH SERPL L TO P-CCNC: 412 U/L — HIGH (ref 50–242)
MAGNESIUM SERPL-MCNC: 2.2 MG/DL — SIGNIFICANT CHANGE UP (ref 1.6–2.6)
MCHC RBC-ENTMCNC: 27.6 PG — SIGNIFICANT CHANGE UP (ref 27–34)
MCHC RBC-ENTMCNC: 31.4 GM/DL — LOW (ref 32–36)
MCV RBC AUTO: 88 FL — SIGNIFICANT CHANGE UP (ref 80–100)
NRBC # BLD: 0 /100 WBCS — SIGNIFICANT CHANGE UP (ref 0–0)
PHOSPHATE SERPL-MCNC: 3.4 MG/DL — SIGNIFICANT CHANGE UP (ref 2.5–4.5)
PLATELET # BLD AUTO: 340 K/UL — SIGNIFICANT CHANGE UP (ref 150–400)
POTASSIUM SERPL-MCNC: 4.7 MMOL/L — SIGNIFICANT CHANGE UP (ref 3.5–5.3)
POTASSIUM SERPL-SCNC: 4.7 MMOL/L — SIGNIFICANT CHANGE UP (ref 3.5–5.3)
RBC # BLD: 4.09 M/UL — SIGNIFICANT CHANGE UP (ref 3.8–5.2)
RBC # FLD: 13.6 % — SIGNIFICANT CHANGE UP (ref 10.3–14.5)
SODIUM SERPL-SCNC: 137 MMOL/L — SIGNIFICANT CHANGE UP (ref 135–145)
WBC # BLD: 8.65 K/UL — SIGNIFICANT CHANGE UP (ref 3.8–10.5)
WBC # FLD AUTO: 8.65 K/UL — SIGNIFICANT CHANGE UP (ref 3.8–10.5)

## 2020-04-02 PROCEDURE — 99232 SBSQ HOSP IP/OBS MODERATE 35: CPT

## 2020-04-02 RX ADMIN — ENOXAPARIN SODIUM 40 MILLIGRAM(S): 100 INJECTION SUBCUTANEOUS at 05:20

## 2020-04-02 RX ADMIN — ATORVASTATIN CALCIUM 10 MILLIGRAM(S): 80 TABLET, FILM COATED ORAL at 21:36

## 2020-04-02 RX ADMIN — Medication 1 PACKET(S): at 09:11

## 2020-04-02 RX ADMIN — Medication 200 MILLIGRAM(S): at 05:20

## 2020-04-02 RX ADMIN — Medication 4: at 15:05

## 2020-04-02 RX ADMIN — Medication 5 MILLIGRAM(S): at 05:20

## 2020-04-02 RX ADMIN — ENOXAPARIN SODIUM 40 MILLIGRAM(S): 100 INJECTION SUBCUTANEOUS at 17:25

## 2020-04-02 RX ADMIN — Medication 1: at 08:44

## 2020-04-02 RX ADMIN — AMLODIPINE BESYLATE 5 MILLIGRAM(S): 2.5 TABLET ORAL at 05:20

## 2020-04-02 RX ADMIN — ZINC SULFATE TAB 220 MG (50 MG ZINC EQUIVALENT) 220 MILLIGRAM(S): 220 (50 ZN) TAB at 15:09

## 2020-04-02 RX ADMIN — Medication 200 MILLIGRAM(S): at 15:09

## 2020-04-02 RX ADMIN — ATAZANAVIR 300 MILLIGRAM(S): 200 CAPSULE ORAL at 15:09

## 2020-04-02 RX ADMIN — Medication 500 MILLIGRAM(S): at 15:09

## 2020-04-02 NOTE — PROGRESS NOTE ADULT - ASSESSMENT
59 years old female wit history of HTN, HLD & DM II who p/w fever, body aches & hypoxia due sepsis POA from PNA due to COVID19.   - patient sating 84% on RA. improved on NC  - flu & RSV negative  - CXR showed nonspecific mild increased interstitial lung markings with airspace opacity right lung base  - c/w Plaquenil and Reyataz  - NGTD on cultures      Essential hypertension  - c/w Norvasc and enalapril     DM II  - c/w ISS  - Hgb A1C is 7.1    HLD  - c/w Lipitor     Prophylaxis:  DVT: Lovenox  GI: PO diet 59 years old female wit history of HTN, HLD & DM II who p/w fever, body aches & hypoxia due sepsis POA from PNA due to COVID19.   - patient sating 84% on RA. improved on NC  - flu & RSV negative  - CXR showed nonspecific mild increased interstitial lung markings with airspace opacity right lung base  - c/w Plaquenil and Reyataz  - NGTD on cultures      Essential hypertension  - c/w Norvasc and enalapril     DM II  - c/w ISS  - Hgb A1C is 7.1    HLD  - c/w Lipitor     Prophylaxis:  DVT: Lovenox  GI: PO diet     As per RN, patient's saturation dropped to 80 w/ ambulation, will reevaluate tomorrow.

## 2020-04-02 NOTE — DIETITIAN INITIAL EVALUATION ADULT. - ENERGY NEEDS
Height (cm): 157.5 (03-31)  Weight (kg): 107 (03-31)  BMI (kg/m2): 43.1 (03-31)  IBW:  50 kg +/- 10%   % IBW:  214%    UBW: unknown     %UBW:  unknown

## 2020-04-02 NOTE — DIETITIAN INITIAL EVALUATION ADULT. - OTHER INFO
Unable to conduct face-to-face interview c pt due to isolation precautions for +COVID-19. Several attempts to speak c pt via phone were unsuccessful. Per medical record, pt lives c spouse & is independent c ADL. No reports of N/V/C/D or chew/swallowing difficulty.

## 2020-04-02 NOTE — CHART NOTE - NSCHARTNOTEFT_GEN_A_CORE
Upon Nutritional Assessment by the Registered Dietitian your patient was determined to meet criteria / has evidence of the following diagnosis/diagnoses:          [ ]  Mild Protein Calorie Malnutrition        [ ]  Moderate Protein Calorie Malnutrition        [ ] Severe Protein Calorie Malnutrition        [ ] Unspecified Protein Calorie Malnutrition        [ ] Underweight / BMI <19        [x ] Morbid Obesity / BMI > 40      Findings as based on:  •  Comprehensive nutrition assessment and consultation  •  Calorie counts (nutrient intake analysis)  •  Food acceptance and intake status from observations by staff  •  Follow up  •  Patient education  •  Intervention secondary to interdisciplinary rounds  •   concerns      Treatment:    The following diet has been recommended:  [facility] no snack + DASH diets c Glucerna x 1/day (provides 220 kcal, 10 g protein)       PROVIDER Section:     By signing this assessment you are acknowledging and agree with the diagnosis/diagnoses assigned by the Registered Dietitian    Comments:

## 2020-04-02 NOTE — PROGRESS NOTE ADULT - SUBJECTIVE AND OBJECTIVE BOX
59 years old female wit history of HTN, HLD & DM II who p/w fever, body aches & hypoxia. She is lying in bed in NAD.      MEDICATIONS  (STANDING):  amLODIPine   Tablet 5 milliGRAM(s) Oral daily  ascorbic acid 500 milliGRAM(s) Oral daily  atazanavir 300 milliGRAM(s) Oral daily  atorvastatin 10 milliGRAM(s) Oral at bedtime  dextrose 5%. 1000 milliLiter(s) (50 mL/Hr) IV Continuous <Continuous>  dextrose 50% Injectable 12.5 Gram(s) IV Push once  dextrose 50% Injectable 25 Gram(s) IV Push once  dextrose 50% Injectable 25 Gram(s) IV Push once  enalapril 5 milliGRAM(s) Oral daily  enoxaparin Injectable 40 milliGRAM(s) SubCutaneous every 12 hours  hydroxychloroquine 200 milliGRAM(s) Oral every 12 hours  hydroxychloroquine   Oral   insulin lispro (HumaLOG) corrective regimen sliding scale   SubCutaneous three times a day before meals  zinc sulfate 220 milliGRAM(s) Oral daily    MEDICATIONS  (PRN):  dextrose 40% Gel 15 Gram(s) Oral once PRN Blood Glucose LESS THAN 70 milliGRAM(s)/deciliter  glucagon  Injectable 1 milliGRAM(s) IntraMuscular once PRN Glucose LESS THAN 70 milligrams/deciliter      Allergies    No Known Allergies    Intolerances        Vital Signs Last 24 Hrs  T(C): 36.4 (02 Apr 2020 11:40), Max: 36.6 (02 Apr 2020 05:39)  T(F): 97.6 (02 Apr 2020 11:40), Max: 97.8 (02 Apr 2020 05:39)  HR: 90 (02 Apr 2020 11:40) (84 - 90)  BP: 112/62 (02 Apr 2020 11:40) (112/62 - 117/65)  BP(mean): --  RR: 17 (02 Apr 2020 11:40) (17 - 18)  SpO2: 97% (02 Apr 2020 15:00) (80% - 97%)    PHYSICAL EXAM:  GENERAL: NAD, well-groomed, well-developed  HEAD:  Atraumatic, Normocephalic  EYES: EOMI, PERRLA   NECK: Supple   NERVOUS SYSTEM:  Alert   CHEST/LUNG: Clear to auscultation bilaterally; No rales, rhonchi, wheezing, or rubs  HEART: Regular rate and rhythm; No murmurs, rubs, or gallops  ABDOMEN: Soft, Nontender, Nondistended; Bowel sounds present  EXTREMITIES: No clubbing, cyanosis, or edema    LABS:                        11.3   8.65  )-----------( 340      ( 02 Apr 2020 07:48 )             36.0     04-02    137  |  101  |  17  ----------------------------<  173<H>  4.7   |  31  |  0.94    Ca    8.5      02 Apr 2020 07:48  Phos  3.4     04-02  Mg     2.2     04-02    TPro  6.9  /  Alb  2.7<L>  /  TBili  0.5  /  DBili  x   /  AST  21  /  ALT  21  /  AlkPhos  92  04-01        CAPILLARY BLOOD GLUCOSE      POCT Blood Glucose.: 130 mg/dL (02 Apr 2020 17:28)  POCT Blood Glucose.: 318 mg/dL (02 Apr 2020 11:06)  POCT Blood Glucose.: 195 mg/dL (02 Apr 2020 07:44)  POCT Blood Glucose.: 237 mg/dL (01 Apr 2020 22:17)      Culture - Urine (collected 31 Mar 2020 00:33)  Source: .Urine Clean Catch (Midstream)  Final Report (31 Mar 2020 20:40):    <10,000 CFU/mL Normal Urogenital Radha    Culture - Blood (collected 30 Mar 2020 17:22)  Source: .Blood Blood-Peripheral  Preliminary Report (31 Mar 2020 18:02):    No growth to date.    Culture - Blood (collected 30 Mar 2020 17:00)  Source: .Blood Blood-Peripheral  Preliminary Report (31 Mar 2020 17:02):    No growth to date.      RADIOLOGY & ADDITIONAL TESTS:    04-01-20 @ 07:01  -  04-02-20 @ 07:00  --------------------------------------------------------  IN:    Oral Fluid: 240 mL  Total IN: 240 mL    OUT:  Total OUT: 0 mL    Total NET: 240 mL

## 2020-04-02 NOTE — DIETITIAN INITIAL EVALUATION ADULT. - PERTINENT LABORATORY DATA
04-02 Na137 mmol/L Glu 173 mg/dL<H> K+ 4.7 mmol/L Cr  0.94 mg/dL BUN 17 mg/dL 04-02 Phos 3.4 mg/dL 04-01 Alb 2.7 g/dL<L> 04-01 [id number] 7.1 %<H>, POCT: 148, 298, 295, 237

## 2020-04-03 ENCOUNTER — TRANSCRIPTION ENCOUNTER (OUTPATIENT)
Age: 60
End: 2020-04-03

## 2020-04-03 VITALS
RESPIRATION RATE: 18 BRPM | TEMPERATURE: 99 F | OXYGEN SATURATION: 98 % | SYSTOLIC BLOOD PRESSURE: 129 MMHG | DIASTOLIC BLOOD PRESSURE: 73 MMHG | HEART RATE: 94 BPM

## 2020-04-03 LAB
CRP SERPL-MCNC: 2.81 MG/DL — HIGH (ref 0–0.4)
CULTURE RESULTS: SIGNIFICANT CHANGE UP
CULTURE RESULTS: SIGNIFICANT CHANGE UP
LDH SERPL L TO P-CCNC: 309 U/L — HIGH (ref 50–242)
SPECIMEN SOURCE: SIGNIFICANT CHANGE UP
SPECIMEN SOURCE: SIGNIFICANT CHANGE UP

## 2020-04-03 PROCEDURE — 99239 HOSP IP/OBS DSCHRG MGMT >30: CPT

## 2020-04-03 RX ORDER — HYDROXYCHLOROQUINE SULFATE 200 MG
1 TABLET ORAL
Qty: 2 | Refills: 0
Start: 2020-04-03 | End: 2020-04-03

## 2020-04-03 RX ORDER — AMLODIPINE BESYLATE 2.5 MG/1
1 TABLET ORAL
Qty: 0 | Refills: 0 | DISCHARGE

## 2020-04-03 RX ORDER — ATAZANAVIR 200 MG/1
1 CAPSULE ORAL
Qty: 1 | Refills: 0
Start: 2020-04-03 | End: 2020-04-03

## 2020-04-03 RX ORDER — AMLODIPINE BESYLATE 2.5 MG/1
1 TABLET ORAL
Qty: 30 | Refills: 0
Start: 2020-04-03 | End: 2020-05-02

## 2020-04-03 RX ORDER — ATORVASTATIN CALCIUM 80 MG/1
1 TABLET, FILM COATED ORAL
Qty: 0 | Refills: 0 | DISCHARGE

## 2020-04-03 RX ORDER — LOSARTAN POTASSIUM 100 MG/1
1 TABLET, FILM COATED ORAL
Qty: 0 | Refills: 0 | DISCHARGE

## 2020-04-03 RX ORDER — METFORMIN HYDROCHLORIDE 850 MG/1
1 TABLET ORAL
Qty: 0 | Refills: 0 | DISCHARGE

## 2020-04-03 RX ADMIN — ATAZANAVIR 300 MILLIGRAM(S): 200 CAPSULE ORAL at 14:38

## 2020-04-03 RX ADMIN — ENOXAPARIN SODIUM 40 MILLIGRAM(S): 100 INJECTION SUBCUTANEOUS at 05:46

## 2020-04-03 RX ADMIN — Medication 500 MILLIGRAM(S): at 14:38

## 2020-04-03 RX ADMIN — AMLODIPINE BESYLATE 5 MILLIGRAM(S): 2.5 TABLET ORAL at 05:46

## 2020-04-03 RX ADMIN — ZINC SULFATE TAB 220 MG (50 MG ZINC EQUIVALENT) 220 MILLIGRAM(S): 220 (50 ZN) TAB at 14:38

## 2020-04-03 RX ADMIN — Medication 200 MILLIGRAM(S): at 02:35

## 2020-04-03 RX ADMIN — Medication 5 MILLIGRAM(S): at 05:46

## 2020-04-03 RX ADMIN — Medication 200 MILLIGRAM(S): at 14:38

## 2020-04-03 NOTE — DISCHARGE NOTE PROVIDER - HOSPITAL COURSE
59 years old female wit history of HTN, HLD & DM II who p/w fever, body aches & hypoxia due sepsis POA from PNA due to COVID19. On admission, the patient was sating 84% on RA and needed NC. CXR showed nonspecific mild increased interstitial lung markings with airspace opacity right lung base. SHe was started on Plaquenil and Reyataz and has come off oxygen. Pt was discharged to home and told to maintain quarentine for 14 days after her symptoms resolve. She was told to return to the ED if her symptoms worsen.         Discharge time: 43 minutes

## 2020-04-03 NOTE — DISCHARGE NOTE NURSING/CASE MANAGEMENT/SOCIAL WORK - PATIENT PORTAL LINK FT
You can access the FollowMyHealth Patient Portal offered by [facility] by registering at the following website: [email or web address]. By joining [employer]’s FollowMyHealth portal, you will also be able to view your health information using other applications (apps) compatible with our system.

## 2020-04-03 NOTE — DISCHARGE NOTE PROVIDER - NSDCMRMEDTOKEN_GEN_ALL_CORE_FT
amLODIPine 5 mg oral tablet: 1 tab(s) orally once a day  atazanavir 300 mg oral capsule: 1 cap(s) orally once a day  atorvastatin 10 mg oral tablet: 1 tab(s) orally once (at bedtime)  enalapril 5 mg oral tablet: 1 tab(s) orally once a day  hydroxychloroquine 200 mg oral tablet: 1 tab(s) orally every 12 hours   metFORMIN 750 mg oral tablet, extended release: 1 tab(s) orally once a day (in the evening)

## 2020-04-15 DIAGNOSIS — I10 ESSENTIAL (PRIMARY) HYPERTENSION: ICD-10-CM

## 2020-04-15 DIAGNOSIS — R09.02 HYPOXEMIA: ICD-10-CM

## 2020-04-15 DIAGNOSIS — E83.39 OTHER DISORDERS OF PHOSPHORUS METABOLISM: ICD-10-CM

## 2020-04-15 DIAGNOSIS — A41.89 OTHER SPECIFIED SEPSIS: ICD-10-CM

## 2020-04-15 DIAGNOSIS — E78.5 HYPERLIPIDEMIA, UNSPECIFIED: ICD-10-CM

## 2020-04-15 DIAGNOSIS — R50.9 FEVER, UNSPECIFIED: ICD-10-CM

## 2020-04-15 DIAGNOSIS — E11.9 TYPE 2 DIABETES MELLITUS WITHOUT COMPLICATIONS: ICD-10-CM

## 2020-04-15 DIAGNOSIS — J12.89 OTHER VIRAL PNEUMONIA: ICD-10-CM

## 2020-04-15 DIAGNOSIS — E66.01 MORBID (SEVERE) OBESITY DUE TO EXCESS CALORIES: ICD-10-CM

## 2020-11-17 ENCOUNTER — OFFICE VISIT (OUTPATIENT)
Dept: OBSTETRICS AND GYNECOLOGY | Facility: CLINIC | Age: 60
End: 2020-11-17
Payer: COMMERCIAL

## 2020-11-17 VITALS — WEIGHT: 172 LBS | BODY MASS INDEX: 27.64 KG/M2 | HEIGHT: 66 IN

## 2020-11-17 DIAGNOSIS — F52.22 FEMALE SEXUAL INTEREST/AROUSAL DISORDER, ACQUIRED, GENERALIZED, MILD: Primary | ICD-10-CM

## 2020-11-17 PROCEDURE — 99203 OFFICE O/P NEW LOW 30 MIN: CPT | Mod: S$GLB,,, | Performed by: OBSTETRICS & GYNECOLOGY

## 2020-11-17 PROCEDURE — 99999 PR PBB SHADOW E&M-EST. PATIENT-LVL III: CPT | Mod: PBBFAC,,, | Performed by: OBSTETRICS & GYNECOLOGY

## 2020-11-17 PROCEDURE — 99999 PR PBB SHADOW E&M-EST. PATIENT-LVL III: ICD-10-PCS | Mod: PBBFAC,,, | Performed by: OBSTETRICS & GYNECOLOGY

## 2020-11-17 PROCEDURE — 99203 PR OFFICE/OUTPT VISIT, NEW, LEVL III, 30-44 MIN: ICD-10-PCS | Mod: S$GLB,,, | Performed by: OBSTETRICS & GYNECOLOGY

## 2020-11-17 RX ORDER — TRIAMTERENE AND HYDROCHLOROTHIAZIDE 37.5; 25 MG/1; MG/1
CAPSULE ORAL
COMMUNITY
Start: 2020-10-01 | End: 2021-03-30

## 2020-11-17 RX ORDER — TRIAMTERENE AND HYDROCHLOROTHIAZIDE 37.5; 25 MG/1; MG/1
CAPSULE ORAL
COMMUNITY
Start: 2020-10-01

## 2020-11-17 RX ORDER — PANTOPRAZOLE SODIUM 40 MG/1
TABLET, DELAYED RELEASE ORAL
COMMUNITY
Start: 2020-10-01 | End: 2021-03-30

## 2020-11-17 RX ORDER — PANTOPRAZOLE SODIUM 40 MG/1
TABLET, DELAYED RELEASE ORAL
COMMUNITY
Start: 2020-08-19

## 2020-11-17 RX ORDER — ATORVASTATIN CALCIUM 20 MG/1
20 TABLET, FILM COATED ORAL NIGHTLY
COMMUNITY
Start: 2020-10-15

## 2020-11-17 RX ORDER — ATORVASTATIN CALCIUM 20 MG/1
TABLET, FILM COATED ORAL
COMMUNITY
Start: 2020-10-15 | End: 2021-03-14

## 2020-11-17 NOTE — PATIENT INSTRUCTIONS
Please use a vaginal lubricant such as KY gel which available over-the-counter in any pharmacy.  It is to be used prior to intercourse.    Understanding Female Sexual Arousal Disorder    What is female sexual arousal disorder?  Female sexual arousal disorder occurs when the body doesnt respond to sexual stimulation.   It used to be considered its own condition. Doctors treated it differently than hypoactive sexual desire disorder. That refers to a lack of desire for sexual activity.   However, experts recently concluded its very difficult to distinguish between these two conditions. In response, doctors now use the term female sexual interest/arousal disorder (FSIAD), according to new guidelines in the new edition of the Diagnostic and Statistical Manual of Mental Disorders (DSM-5).  FSIAD is one of several conditions that fall under the umbrella of sexual dysfunction, which also includes:   pain during intercourse  inability to orgasm  lack of sexual desire    While having a sexual dysfunction can feel isolating, its actually fairly common. About 40 percent of women experience some type of sexual dysfunction, such as FSIAD, in their life.     FSIAD can be frustrating, but it is treatable. Read on to learn more about the symptoms and available treatment options.    What are the symptoms?  For many women, the symptoms of FSIAD come and go. Some have symptoms every time they try to have intercourse or engage in sexual activity. Others may only experience them occasionally.   Symptoms of FSIAD include:   Decreased sexual desire. You may begin to lose interest in sex. While this can be due to lack of arousal, it may also be a symptom of stress and anxiety from having FSIAD.  Few thoughts related to sex. You may rarely think about sex.  Less initiation of sexual activity. You may not initiate sex and may be unreceptive to a partners attempts to initiate sex.  Decreased sexual excitement or pleasure during sex.  Sexual stimulation or other things that used to turn you on no longer do.   Reduced arousal from internal or external sexual cues. You may no longer be aroused by cues like psychological intimacy, reading about enjoyable sex, or recalling an erotic fantasy.  Lack of genital or nongenital sensations during sex. When having sex, you might not feel much in your genital area or other erogenous zones.    What causes it?  Arousal sets off a series of events in the body: Blood flow to the tissues around the vaginal opening and clitoris increases, causing swelling. The vagina produces natural lubricant.   These events are the result of a series of chain reactions. Any interruption in the process can cause FSIAD.   Many things, both psychological and physical, can affect the arousal process.     Psychological causes  Emotional and mental health issues that can cause FSIAD include:  low self-esteem  poor body image  stress  anxiety  depression  relationship problems  negative thoughts  guilt  trauma    Hormonal causes  Hormones are a necessary element of arousal. Changes in hormone levels may affect your ability to be aroused. However, its not clear whether theres a direct relationship between hormone levels and sexual function.  Things that can cause hormonal changes and possible FSIAD include:   menopause  birth control pills  pregnancy    Anatomical causes  Arousal depends greatly on the bodys circulatory and neurological systems. Problems with either of these may cause FSIAD.  Some potential anatomical causes include:  diminished blood flow to the vagina  nerve damage in the pelvis  infection of the vagina or bladder  thinning, drying vaginal tissues    Other causes  Other factors can also lead to FSIAD, including:   Medications. Selective serotonin reuptake inhibitors (SSRIs), a type of antidepressant, may cause FSIAD.   Medical treatments. If youre undergoing chemotherapy or radiation, you may experience FSIAD.  Likewise, a recent surgery may interfere with arousal and sexual stimulation.  Inadequate sexual simulation. You might have trouble getting aroused if the stimulation you receive from yourself or your partner isnt sufficient.   Diabetes. Diabetes can affect your nervous and vascular systems. This may make arousal more difficult because your body is unable to send the necessary hormones, blood, and electrical signals for arousal.    Who gets it?   While FSIAD can affect any woman, older women seem to experience it more. Because FSIAD is a newly defined term according to the DSM-5, studies on its actual occurrence havent yet been published.   Studies on female sexual arousal disorder show that low sexual desire and problems with sexual arousal vary widely by age, cultural setting, duration of symptoms, and presence of distress.    A 2009 study found that 3.3 percent of participants between the ages of 18 and 44 had female sexual arousal disorder, while 7.5 percent of participants between the ages of 45 and 64 experienced it.     How is it diagnosed?  FSIAD is sometimes hard for doctors to diagnose since many cases involve a combination of underlying conditions. In addition, many women may feel uncomfortable talking to their doctor about their symptoms and their sex life. This prevents a lot of women from getting diagnosed.   Most doctors start by asking a series of questions about your sexual and mental health. Next, theyll likely give you a pelvic exam to rule out any physical causes of your symptoms, such as an infection or nerve damage.   In some cases, your doctor may also do a complete blood count test to get a better idea of your overall health.   If your doctor determines your symptoms arent due to physical causes, they may refer you to a psychotherapist who specializes in sexual health. This health professional can help you discover the emotional cause behind your FSIAD and help you create a treatment plan  that suits you.  If you dont feel comfortable talking to your doctor about your symptoms, consider looking for a new one.   The International Society for the Study of Womens Health offers a tool that allows you to search for trained sexual health providers in your area.   The American Association of Sexuality Educators, Counselors and Therapists (AASECT) also provides a national directory of certified sex therapists and counselors.       How is it treated?  Treatment for FSIAD focuses on identifying any underlying causes and treating them. Many women find that a combination of treatments seems to work best.   Depending on the underlying cause, treatments often include medication, therapy, or a combination of both.  Some medication-related treatments include:  Hormone therapy. If the underlying cause is hormonal, hormone therapy may help treat low estrogen or testosterone, vaginal dryness, or pain during intercourse.  Changing medication dosage. If a medication you take, such as an antidepressant, is causing your symptoms, adjusting your dosage may help.   Working with a therapist who specializes in sexual health, either on your own or with your partner, can also help address some of the psychological elements of FSIAD.   Even if you dont have any underlying mental health conditions, a therapist can help you identify what actually stimulates you and any barriers that are getting in the way. They can also provide guidance on how to build trust and intimacy with your partner, which can play a large role in arousal.   When looking for a therapist, consider seeing someone whos a sex therapist. These are mental health professionals who focus on different aspects of sexuality, from dealing with past trauma to helping people identify what arouses them.   You can start your search with AASECTs directory of sex therapists in both the [place] and other countries.  At home, you can also try creating a relaxing  environment to help your brain and body prepare for a variety of sexual activities.   Deephaven with different elements, such as soft lighting, relaxing music, or soft fabrics. If youre in a partnered relationship, you can also try talking to your partner about trying sexual activities outside of intercourse, such as massage or showering together.   A sex therapist can also assign homework, such as masturbation and fantasy training (which you can do with or without a partner). They can also provide you tools to help improve sexual communication.    The bottom line  Many women experience a form of sexual dysfunction at some point, including problems being aroused. While having FSIAD can feel isolating and frustrating, it can be treated.  Start by making an appointment with your doctor to rule out any underlying physical or psychological conditions that might be causing your symptoms. You can also try seeing a sex therapist either on your own or with your partner.

## 2020-11-17 NOTE — PROGRESS NOTES
"Cc:  Lack of sexual feeling    HPI:  60 yro female who has been menopausal since age 49 presents to discuss vaginal dryness and lack of sexual "feeling."  Pt has gyn care with RODNEY Toussaint at [facility].  Pt states she is up to date with all care.    Vitals:    11/17/20 0941   Weight: 78 kg (172 lb)   Height: 5' 6" (1.676 m)     Physical Exam  Vitals signs reviewed. Exam conducted with a chaperone present.   Constitutional:       General: She is not in acute distress.     Appearance: Normal appearance. She is normal weight. She is not ill-appearing, toxic-appearing or diaphoretic.   HENT:      Head: Normocephalic.   Pulmonary:      Effort: Pulmonary effort is normal. No respiratory distress.   Abdominal:      General: Abdomen is flat. There is no distension.      Palpations: There is no mass.      Tenderness: There is no abdominal tenderness. There is no guarding or rebound.      Hernia: No hernia is present. There is no hernia in the left inguinal area or right inguinal area.   Genitourinary:     Exam position: Lithotomy position.      Labia:         Right: No rash, tenderness, lesion or injury.         Left: No rash, tenderness, lesion or injury.       Vagina: Normal.      Cervix: Normal.      Uterus: Normal.       Adnexa: Right adnexa normal.        Right: No mass, tenderness or fullness.          Left: No mass or tenderness.         Musculoskeletal: Normal range of motion.         General: No swelling, tenderness, deformity or signs of injury.   Skin:     General: Skin is warm.   Neurological:      General: No focal deficit present.      Mental Status: She is alert.   Psychiatric:         Mood and Affect: Mood normal.         Behavior: Behavior normal.         Thought Content: Thought content normal.         Judgment: Judgment normal.           30 minute discussion about Female Sexual arousal disorder  Clarified w/ pt about what she means about sexual feelings.  Pt states she has a partner with " whom she has been with for 7 years.  She states they attempt intercourse approx twice per month.  Pt admits vaginal dryness and has never used a lubricant.  Pt states she does desire intercourse.  Pt states partner tries to set a romantic mood.  Pt denies pain with intercourse.  Pt does not feel pressured for intercourse.    Assessment/Plan  1. 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   - info on Female Sexual Arousal Disorder given   - no evidence of any anatomic problem

## 2020-12-31 NOTE — PATIENT PROFILE ADULT - OVER THE PAST TWO WEEKS, HAVE YOU FELT LITTLE INTEREST OR PLEASURE IN DOING THINGS?
Shahzad Sotomayor MD routed conversation to NIESHA Sotomayor Nurse Msg Pool 20 hours ago (5:28 PM)      Shahzad Sotomayor MD 20 hours ago (5:28 PM)        I see patient doesn't have 3 month follow up schedule from our previous visit, as she was unsure if she was going to continue seeing me after discussing my chronic pain care plan.     I just want her to be aware I can provide the pain medicine as we discussed at our visit for up to 3 months after the visit but if there is not follow up at 3 months I will be unable to continue filling.      Please have her either schedule a follow up 3 months after we had our last visit, or have her schedule with a different provider she mentioned at our visit before then so she has a plan in place.        Spoke with patient she states she will call and schedule 3 month follow up.  As of 12/31/2020 at 2pm this has not been done.   no

## 2021-08-26 ENCOUNTER — HOSPITAL ENCOUNTER (EMERGENCY)
Facility: HOSPITAL | Age: 61
Discharge: HOME | End: 2021-08-26
Payer: COMMERCIAL

## 2021-08-26 VITALS
DIASTOLIC BLOOD PRESSURE: 60 MMHG | TEMPERATURE: 99 F | HEIGHT: 65 IN | SYSTOLIC BLOOD PRESSURE: 119 MMHG | OXYGEN SATURATION: 97 % | WEIGHT: 160 LBS | HEART RATE: 74 BPM | RESPIRATION RATE: 18 BRPM | BODY MASS INDEX: 26.66 KG/M2

## 2021-08-26 DIAGNOSIS — K61.0 PERIANAL ABSCESS: Primary | ICD-10-CM

## 2021-08-26 PROCEDURE — 46050 I&D PERIANAL ABSCESS SUPFC: CPT

## 2021-08-26 PROCEDURE — 25000003 PHARM REV CODE 250: Performed by: PHYSICIAN ASSISTANT

## 2021-08-26 PROCEDURE — 99284 PR EMERGENCY DEPT VISIT,LEVEL IV: ICD-10-PCS | Mod: 25,,, | Performed by: PHYSICIAN ASSISTANT

## 2021-08-26 PROCEDURE — 87070 CULTURE OTHR SPECIMN AEROBIC: CPT | Performed by: PHYSICIAN ASSISTANT

## 2021-08-26 PROCEDURE — 99284 EMERGENCY DEPT VISIT MOD MDM: CPT | Mod: 25,,, | Performed by: PHYSICIAN ASSISTANT

## 2021-08-26 PROCEDURE — 46050 PR I&D PERIANAL ABSCESS,SUPERFICIAL: ICD-10-PCS | Mod: ,,, | Performed by: PHYSICIAN ASSISTANT

## 2021-08-26 PROCEDURE — 99284 EMERGENCY DEPT VISIT MOD MDM: CPT | Mod: 25

## 2021-08-26 PROCEDURE — 46050 I&D PERIANAL ABSCESS SUPFC: CPT | Mod: ,,, | Performed by: PHYSICIAN ASSISTANT

## 2021-08-26 RX ORDER — LIDOCAINE HYDROCHLORIDE AND EPINEPHRINE 20; 10 MG/ML; UG/ML
10 INJECTION, SOLUTION INFILTRATION; PERINEURAL
Status: COMPLETED | OUTPATIENT
Start: 2021-08-26 | End: 2021-08-26

## 2021-08-26 RX ORDER — OXYCODONE AND ACETAMINOPHEN 5; 325 MG/1; MG/1
1 TABLET ORAL
Status: COMPLETED | OUTPATIENT
Start: 2021-08-26 | End: 2021-08-26

## 2021-08-26 RX ORDER — SULFAMETHOXAZOLE AND TRIMETHOPRIM 800; 160 MG/1; MG/1
1 TABLET ORAL 2 TIMES DAILY
Qty: 20 TABLET | Refills: 0 | Status: SHIPPED | OUTPATIENT
Start: 2021-08-26 | End: 2021-09-05

## 2021-08-26 RX ADMIN — OXYCODONE HYDROCHLORIDE AND ACETAMINOPHEN 1 TABLET: 5; 325 TABLET ORAL at 03:08

## 2021-08-26 RX ADMIN — LIDOCAINE HYDROCHLORIDE AND EPINEPHRINE 10 ML: 20; 10 INJECTION, SOLUTION INFILTRATION; PERINEURAL at 05:08

## 2021-08-28 LAB — BACTERIA SPEC AEROBE CULT: NORMAL

## 2022-04-12 ENCOUNTER — APPOINTMENT (OUTPATIENT)
Dept: OPHTHALMOLOGY | Facility: CLINIC | Age: 62
End: 2022-04-12

## 2022-06-09 NOTE — ED PROVIDER NOTE - NSCAREINITIATED _GEN_ER
Chief Complaint   Patient presents with    Follow-up     discuss lab work.  Urinary Frequency        Petr Copeland is a 73 y.o. female who presents today for Follow-up (discuss lab work.) and Urinary Frequency     Following for discussion about multiple abnormalities in her labs,  #1 elevated cholesterol, she reports multiple dietary indiscretions, stressors at work, at home, the passing of her brother, and health issues of her , which has been triggering her stress eating disorder. She believes can make changes during the next few months. 2.  Her TSH is elevated with normal T4, she has been taking generic form of levothyroxine, but she feels was doing better when she was taking the Synthroid,    #3 elevated H&H, and decreased GFR. She reports has been drinking coffee and tea, and less water, has not been drinking plenty fluid during the day, and has been working day outside. Blood work from 2020 shows normal kidney function test, but kidney function test from few months ago was slightly decreased. Reports urinary frequency, occasional burning, but this has been ongoing issue for few months, urine dip  today is not showing signs of infection    Wt Readings from Last 3 Encounters:   06/09/22 184 lb 9.6 oz (83.7 kg)   06/06/22 186 lb (84.4 kg)   12/08/21 187 lb (84.8 kg)     Vitals:    06/09/22 1301   BP: 130/68   Site: Left Upper Arm   Position: Sitting   Pulse: 60   Resp: 12   SpO2: 98%   Weight: 184 lb 9.6 oz (83.7 kg)   Height: 5' 2\" (1.575 m)        Assessment and plan:  1. Hypothyroidism, adult  Assessment & Plan:   Her TSH is elevated, with normal T4, full tried to send brand-name Synthroid 75 mcg to the pharmacy and see if her thyroid tests improve and symptoms. ,  Orders:  -     SYNTHROID 75 MCG tablet; Take 1 tablet by mouth Daily Please dispense Synthroid, Disp-90 tablet, R-1, DAWNormal  -     TSH; Future  -     T4, Free; Future  -     Lipid Panel;  Future  -     CBC with Auto Differential; Future  -     Comprehensive Metabolic Panel; Future  2. Urinary frequency  -     AMB POC URINALYSIS DIP STICK AUTO W/O MICRO  3. Mixed hyperlipidemia  Assessment & Plan:  Her calculated risk using the American Heart Association risk assessment tool, is below 7, 6.3. We discussed about importance of making changes in lifestyle, dietary changes, exercise. We will repeat labs in 3 months, and focus in weight loss. Orders:  -     TSH; Future  -     T4, Free; Future  -     Lipid Panel; Future  -     CBC with Auto Differential; Future  -     Comprehensive Metabolic Panel; Future  4. Decreased GFR  Her blood count, and kidney function tests abnormalities could be associated with dehydration, she will adjust the fluid intake, decrease caffeinated drinks, and repeat test in 3 months. Return in about 3 months (around 9/9/2022) for hyperlipidemia, labs , hypothyroidism. Review of system:    Review of Systems   Constitutional: Negative for activity change, chills, fatigue and fever. Respiratory: Negative for cough, chest tightness and shortness of breath. Cardiovascular: Negative for chest pain. Gastrointestinal: Negative for abdominal distention, abdominal pain and constipation. Neurological: Negative for dizziness and headaches. Psychiatric/Behavioral: The patient is not nervous/anxious.           Immunization history:    Immunization History   Administered Date(s) Administered    COVID-19, Pfizer Purple top, DILUTE for use, 12+ yrs, 30mcg/0.3mL dose 02/16/2021, 03/13/2021    Pneumococcal conjugate PCV20, PF (Prevnar 20) 06/06/2022    Tdap (Boostrix, Adacel) 10/18/2013       Current medications:      Current Outpatient Medications:     SYNTHROID 75 MCG tablet, Take 1 tablet by mouth Daily Please dispense Synthroid, Disp: 90 tablet, Rfl: 1    vitamin D (CHOLECALCIFEROL) 25 MCG (1000 UT) TABS tablet, Take by mouth daily, Disp: , Rfl:     clobetasol (TEMOVATE) 0.05 % cream, Apply topically 2 times daily, Disp: , Rfl:     nystatin (MYCOSTATIN) 347149 UNIT/GM powder, Apply topically 4 times daily , Disp: , Rfl:     nystatin-triamcinolone (MYCOLOG II) 980422-0.1 UNIT/GM-% cream, Apply topically 3 times daily, Disp: , Rfl:       Family history:    Family History   Problem Relation Age of Onset    Rheum Arthritis Mother     Other Mother         neutropenia    Hypertension Mother     Hypertension Brother     Gout Brother     Alzheimer's Disease Maternal Grandmother     Breast Cancer Neg Hx     Coronary Art Dis Maternal Grandfather     Heart Disease Maternal Grandfather     Thyroid Disease Sister         hypo    Asthma Sister     Hypertension Sister     Heart Disease Maternal Uncle     Stroke Maternal Grandmother         Past medical history:    Past Medical History:   Diagnosis Date    Eczema 6/17/2016    Hypothyroidism, adult     Lichen sclerosus 4/79/7961    Menopause           Physical exam:    /68 (Site: Left Upper Arm, Position: Sitting)   Pulse 60   Resp 12   Ht 5' 2\" (1.575 m)   Wt 184 lb 9.6 oz (83.7 kg)   SpO2 98%   BMI 33.76 kg/m²     Physical Exam  Vitals reviewed. Constitutional:       Appearance: Normal appearance. HENT:      Head: Normocephalic. Pulmonary:      Effort: Pulmonary effort is normal.   Neurological:      General: No focal deficit present. Mental Status: She is alert.    Psychiatric:         Mood and Affect: Mood normal.          Recent labs:    Lab Results   Component Value Date    CHOL 267 (H) 06/06/2022     Lab Results   Component Value Date    TRIG 147 06/06/2022     Lab Results   Component Value Date    HDL 62 (H) 06/06/2022     Lab Results   Component Value Date    LDLCALC 175.6 (H) 06/06/2022     Lab Results   Component Value Date    LABVLDL 29.4 (H) 06/06/2022     Lab Results   Component Value Date    CHOLHDLRATIO 4.3 06/06/2022     Lab Results   Component Value Date     06/06/2022    K 4.9 06/06/2022     (H) 06/06/2022    CO2 18 (L) 06/06/2022    BUN 21 06/06/2022    CREATININE 1.10 (H) 06/06/2022    GLUCOSE 81 06/06/2022    CALCIUM 9.9 06/06/2022    PROT 7.9 06/06/2022    LABALBU 4.8 (H) 06/06/2022    BILITOT 0.6 06/06/2022    ALKPHOS 67 06/06/2022    AST 21 06/06/2022    ALT 35 06/06/2022    LABGLOM 53 (L) 06/06/2022    GFRAA >60 06/06/2022    AGRATIO 2.1 04/28/2022    GLOB 3.1 06/06/2022     Lab Results   Component Value Date    WBC 4.9 06/06/2022    HGB 16.5 (H) 06/06/2022    HCT 51.5 (H) 06/06/2022    MCV 91.2 06/06/2022     06/06/2022             This document was generated with the aid of voice recognition software. . Please be aware that there may be inadvertent transcription errors not identified and corrected by the [employer] Jimi Chris(Attending)

## 2022-06-23 ENCOUNTER — HOSPITAL ENCOUNTER (OUTPATIENT)
Dept: RADIOLOGY | Facility: HOSPITAL | Age: 62
Discharge: HOME | End: 2022-06-23
Attending: NURSE PRACTITIONER
Payer: COMMERCIAL

## 2022-06-23 VITALS — HEIGHT: 65 IN | WEIGHT: 160 LBS | BODY MASS INDEX: 26.66 KG/M2

## 2022-06-23 DIAGNOSIS — Z12.31 VISIT FOR SCREENING MAMMOGRAM: ICD-10-CM

## 2022-06-23 PROCEDURE — 77067 SCR MAMMO BI INCL CAD: CPT | Mod: 26,,, | Performed by: RADIOLOGY

## 2022-06-23 PROCEDURE — 77067 MAMMO DIGITAL SCREENING BILAT WITH TOMO: ICD-10-PCS | Mod: 26,,, | Performed by: RADIOLOGY

## 2022-06-23 PROCEDURE — 77063 BREAST TOMOSYNTHESIS BI: CPT | Mod: TC

## 2022-06-23 PROCEDURE — 77063 MAMMO DIGITAL SCREENING BILAT WITH TOMO: ICD-10-PCS | Mod: 26,,, | Performed by: RADIOLOGY

## 2022-06-23 PROCEDURE — 77063 BREAST TOMOSYNTHESIS BI: CPT | Mod: 26,,, | Performed by: RADIOLOGY

## 2022-10-10 PROBLEM — Z00.00 ENCOUNTER FOR PREVENTIVE HEALTH EXAMINATION: Status: ACTIVE | Noted: 2022-10-10

## 2022-10-20 ENCOUNTER — APPOINTMENT (OUTPATIENT)
Dept: OTOLARYNGOLOGY | Facility: CLINIC | Age: 62
End: 2022-10-20

## 2022-10-20 DIAGNOSIS — Z86.79 PERSONAL HISTORY OF OTHER DISEASES OF THE CIRCULATORY SYSTEM: ICD-10-CM

## 2022-10-20 DIAGNOSIS — Z83.3 FAMILY HISTORY OF DIABETES MELLITUS: ICD-10-CM

## 2022-10-20 DIAGNOSIS — Z78.9 OTHER SPECIFIED HEALTH STATUS: ICD-10-CM

## 2022-10-20 DIAGNOSIS — Z82.49 FAMILY HISTORY OF ISCHEMIC HEART DISEASE AND OTHER DISEASES OF THE CIRCULATORY SYSTEM: ICD-10-CM

## 2022-10-20 DIAGNOSIS — R22.0 LOCALIZED SWELLING, MASS AND LUMP, HEAD: ICD-10-CM

## 2022-10-20 DIAGNOSIS — Z86.39 PERSONAL HISTORY OF OTHER ENDOCRINE, NUTRITIONAL AND METABOLIC DISEASE: ICD-10-CM

## 2022-10-20 DIAGNOSIS — R22.1 LOCALIZED SWELLING, MASS AND LUMP, HEAD: ICD-10-CM

## 2022-10-20 DIAGNOSIS — K11.20 SIALOADENITIS, UNSPECIFIED: ICD-10-CM

## 2022-10-20 PROCEDURE — 99204 OFFICE O/P NEW MOD 45 MIN: CPT

## 2022-10-20 RX ORDER — AMLODIPINE BESYLATE 10 MG/1
10 TABLET ORAL
Qty: 90 | Refills: 0 | Status: ACTIVE | COMMUNITY
Start: 2022-08-03

## 2022-10-20 RX ORDER — METFORMIN HYDROCHLORIDE 850 MG/1
850 TABLET, COATED ORAL
Qty: 180 | Refills: 0 | Status: ACTIVE | COMMUNITY
Start: 2022-08-12

## 2022-10-20 RX ORDER — LOSARTAN POTASSIUM 100 MG/1
100 TABLET, FILM COATED ORAL
Qty: 90 | Refills: 0 | Status: ACTIVE | COMMUNITY
Start: 2022-08-03

## 2022-10-20 RX ORDER — ATORVASTATIN CALCIUM 10 MG/1
10 TABLET, FILM COATED ORAL
Qty: 90 | Refills: 0 | Status: ACTIVE | COMMUNITY
Start: 2022-05-18

## 2022-10-20 RX ORDER — AMOXICILLIN 875 MG/1
875 TABLET, FILM COATED ORAL
Qty: 20 | Refills: 0 | Status: COMPLETED | COMMUNITY
Start: 2022-08-22

## 2022-10-20 NOTE — REASON FOR VISIT
[Initial Evaluation] : an initial evaluation for [FreeTextEntry2] : left submandibular gland firmess

## 2022-10-20 NOTE — CONSULT LETTER
[Dear  ___] : Dear  [unfilled], [Consult Letter:] : I had the pleasure of evaluating your patient, [unfilled]. [Please see my note below.] : Please see my note below. [Consult Closing:] : Thank you very much for allowing me to participate in the care of this patient.  If you have any questions, please do not hesitate to contact me. [Sincerely,] : Sincerely, [FreeTextEntry2] : Dr Bryant  [FreeTextEntry3] : \par Cameron Martinez MD, FACS\par \par Otolaryngology-Head and Neck Surgery\par [employer] [facility]\par

## 2022-10-20 NOTE — PHYSICAL EXAM
[Midline] : trachea located in midline position [Normal] : no rashes [de-identified] : L DMG is mildly indurated.

## 2022-10-20 NOTE — HISTORY OF PRESENT ILLNESS
[de-identified] : pt is referred by Dr. Bryant for left submandibular firmess. ct of neck on  9/29/2022 MSR showed 3 mm sialolith in the left submandibular gland , possible sialoadenitis. Pt states left submandibular swelling and a lump since 7/2022 and more swelling after meal and mild painful. no dysphagia or fever. Patient noticed a small white stone under the tongue in August and it came out without problems.

## 2023-01-23 ENCOUNTER — HOSPITAL ENCOUNTER (EMERGENCY)
Facility: HOSPITAL | Age: 63
Discharge: HOME | End: 2023-01-23
Attending: EMERGENCY MEDICINE
Payer: COMMERCIAL

## 2023-01-23 VITALS
WEIGHT: 155 LBS | HEIGHT: 65 IN | HEART RATE: 69 BPM | SYSTOLIC BLOOD PRESSURE: 154 MMHG | RESPIRATION RATE: 16 BRPM | BODY MASS INDEX: 25.83 KG/M2 | OXYGEN SATURATION: 98 % | TEMPERATURE: 98 F | DIASTOLIC BLOOD PRESSURE: 69 MMHG

## 2023-01-23 DIAGNOSIS — M79.601 ARM PAIN, RIGHT: Primary | ICD-10-CM

## 2023-01-23 PROBLEM — R73.02 IMPAIRED GLUCOSE TOLERANCE: Status: ACTIVE | Noted: 2020-10-16

## 2023-01-23 PROBLEM — E78.5 HYPERLIPIDEMIA: Status: ACTIVE | Noted: 2020-10-01

## 2023-01-23 PROCEDURE — 99284 EMERGENCY DEPT VISIT MOD MDM: CPT | Mod: 25

## 2023-01-23 PROCEDURE — 93010 ELECTROCARDIOGRAM REPORT: CPT | Mod: ,,, | Performed by: INTERNAL MEDICINE

## 2023-01-23 PROCEDURE — 93010 EKG 12-LEAD: ICD-10-PCS | Mod: ,,, | Performed by: INTERNAL MEDICINE

## 2023-01-23 PROCEDURE — 25000003 PHARM REV CODE 250: Performed by: NURSE PRACTITIONER

## 2023-01-23 PROCEDURE — 93005 ELECTROCARDIOGRAM TRACING: CPT

## 2023-01-23 RX ORDER — METHOCARBAMOL 500 MG/1
1000 TABLET, FILM COATED ORAL
Status: COMPLETED | OUTPATIENT
Start: 2023-01-23 | End: 2023-01-23

## 2023-01-23 RX ORDER — NAPROXEN 500 MG/1
500 TABLET ORAL
Status: COMPLETED | OUTPATIENT
Start: 2023-01-23 | End: 2023-01-23

## 2023-01-23 RX ORDER — METHOCARBAMOL 500 MG/1
1000 TABLET, FILM COATED ORAL EVERY 8 HOURS PRN
Qty: 18 TABLET | Refills: 0 | Status: SHIPPED | OUTPATIENT
Start: 2023-01-23 | End: 2023-01-26

## 2023-01-23 RX ORDER — NAPROXEN 500 MG/1
500 TABLET ORAL EVERY 12 HOURS
Qty: 10 TABLET | Refills: 0 | Status: SHIPPED | OUTPATIENT
Start: 2023-01-23 | End: 2023-01-28

## 2023-01-23 RX ORDER — LIDOCAINE 50 MG/G
1 PATCH TOPICAL
Qty: 5 PATCH | Refills: 0 | Status: SHIPPED | OUTPATIENT
Start: 2023-01-23 | End: 2023-01-28

## 2023-01-23 RX ADMIN — METHOCARBAMOL 1000 MG: 500 TABLET ORAL at 09:01

## 2023-01-23 RX ADMIN — NAPROXEN 500 MG: 500 TABLET ORAL at 09:01

## 2023-01-23 NOTE — DISCHARGE INSTRUCTIONS
§ Please return to the Emergency Department for any new or worsening symptoms including: fever, chest pain, shortness of breath, loss of consciousness, dizziness, weakness, or any other concerns.     § Schedule an appointment for follow up with your Primary Care Doctor as soon as possible for a recheck of your symptoms. If you do not have one, contact the one listed on your discharge paperwork or call the [facility] Appointment Desk at [phone] to schedule an appointment.     § Please take all medication as prescribed. You have been prescribed Naproxen for pain. This is an Non-Steroidal Anti-Inflammatory (NSAID) Medication. Please do not take any additional NSAIDs while you are taking this medication including (Advil, Aleve, Motrin, Ibuprofen, Mobic\meloxicam, Naprosyn, Toradol, ketoralac, etc.). Please stop taking this medication if you experience: weakness, itching, yellow skin or eyes, joint pains, vomiting blood, blood or black stools, unusual weight gain, or swelling in your arms, legs, hands, or feet.     You have been prescribed Robaxin (Methocarbamol) for muscle spasms/pain. Please do not take this medication while working, drinking alcohol, swimming, or while driving/operating heavy machinery. This medication may cause drowsiness, dizziness, impair judgment, and reduce physical capabilities.You should not drive, operate heavy machinery, or make life changing decisions while taking this medication.    Lidocaine Patch - Please place over the area of most pain. You can apply it once every 24 hours. Please remove the patch 12 hours after you apply it.   If you are not able to get the prescription Lidoderm Patch, you can try one of the over the counter Lidocaine Patches.

## 2023-01-23 NOTE — ED PROVIDER NOTES
Encounter Date: 1/23/2023    SCRIBE #1 NOTE: I, Kimberly Vargas, am scribing for, and in the presence of,  SUMMER Akhtar. I have scribed the following portions of the note - Other sections scribed: HPI, ROS.     History     Chief Complaint   Patient presents with    Hand Pain     Pt c/o right hand pain x 2 wks worse in the morning with swelling. Denies injury     This 62 y.o female, with a medical history of Constipation and Hypertension, presents to the ED c/o moderate (6/10) pain and numbness to the right arm radiating into the right hand for the last 2 weeks. Pt report that she has also been experiencing associated intermittent swelling to the right hand upon awaking in the mornings. Not swollen currently. She states that she has been taking Aleve for treatment with only temporary improvement. No recent falls or injuries. She denies neck pain, weakness in the arm, or any other associated symptoms.  Following initial history, patient was examined by my attending, reports 1 episode of chest pain several days ago.  Patient is chest pain-free at this time.    The history is provided by the patient. No  was used.   Review of patient's allergies indicates:  No Known Allergies  Past Medical History:   Diagnosis Date    Constipation     Hypertension      Past Surgical History:   Procedure Laterality Date    TUBAL LIGATION       No family history on file.  Social History     Tobacco Use    Smoking status: Every Day     Packs/day: 0.50     Years: 20.00     Pack years: 10.00     Types: Cigarettes    Smokeless tobacco: Never   Substance Use Topics    Alcohol use: Yes     Comment: 4 cans beer a day X20 yrs    Drug use: Yes     Types: Marijuana     Comment: occasional     Review of Systems   Constitutional:  Negative for fever.   HENT:  Negative for sore throat.    Respiratory:  Negative for shortness of breath.    Cardiovascular:  Positive for chest pain (Resolved).   Gastrointestinal:  Negative for  nausea.   Genitourinary:  Negative for dysuria.   Musculoskeletal:  Negative for back pain and neck pain.        (+) right arm pain radiating into the hand; (+) swelling to right hand (upon awaking in the mornings)   Skin:  Negative for color change, rash and wound.   Neurological:  Positive for numbness (to the right arm; radiating into the right hand). Negative for dizziness, syncope, weakness and headaches.     Physical Exam     Initial Vitals [01/23/23 0842]   BP Pulse Resp Temp SpO2   (!) 154/69 69 16 98.1 °F (36.7 °C) 98 %      MAP       --         Physical Exam    Nursing note and vitals reviewed.  Constitutional: She appears well-developed and well-nourished. She is not diaphoretic. She is cooperative.  Non-toxic appearance. She does not have a sickly appearance. She does not appear ill. No distress.   HENT:   Head: Normocephalic and atraumatic.   Right Ear: External ear normal.   Left Ear: External ear normal.   Nose: Nose normal.   Mouth/Throat: Mucous membranes are normal. No trismus in the jaw.   Eyes: Conjunctivae and EOM are normal.   Neck: Phonation normal. No tracheal deviation present.   Normal range of motion.  Cardiovascular:  Normal rate, regular rhythm and intact distal pulses.           Pulses:       Radial pulses are 2+ on the right side and 2+ on the left side.        Posterior tibial pulses are 2+ on the right side and 2+ on the left side.   Pulmonary/Chest: Effort normal and breath sounds normal. No accessory muscle usage or stridor. No tachypnea and no bradypnea. No respiratory distress. She has no wheezes. She has no rhonchi. She has no rales. She exhibits no tenderness.   Abdominal: She exhibits no distension. There is no abdominal tenderness. There is no rebound.   Musculoskeletal:         General: Normal range of motion.      Cervical back: Normal range of motion. No tenderness or bony tenderness.      Comments: Patient describes pain sensation worse following the C6 dermatome.      Neurological: She is alert and oriented to person, place, and time. She has normal strength. No cranial nerve deficit or sensory deficit. Coordination and gait normal. GCS score is 15. GCS eye subscore is 4. GCS verbal subscore is 5. GCS motor subscore is 6.   Patient with equal strength to bilateral upper lower extremities.  No pronator drift.  No slurred speech.  No facial droop.   Skin: Skin is warm, dry and intact. Capillary refill takes less than 2 seconds. No bruising and no rash noted. No cyanosis or erythema. Nails show no clubbing.   Psychiatric: She has a normal mood and affect. Her behavior is normal. Judgment and thought content normal.       ED Course   Procedures  Labs Reviewed - No data to display  EKG Readings: (Independently Interpreted)   Initial Reading: No STEMI. Rhythm: Sinus Bradycardia. Heart Rate: 54. Ectopy: No Ectopy. Conduction: Normal. ST Segments: Normal ST Segments. T Waves: Normal. Axis: Normal.     Imaging Results    None          Medications   methocarbamoL tablet 1,000 mg (1,000 mg Oral Given 1/23/23 0959)   naproxen tablet 500 mg (500 mg Oral Given 1/23/23 0959)           APC / Resident Notes:   This is an evaluation of a 62 y.o. female that presents to the Emergency Department for right arm pain and numbness sensation. Physical Exam shows a non-toxic, afebrile, and well appearing female.  There is pain to the right arm following approximate the C6 dermatome.  She describes a numbness type sensation, stating she has to move the arm to help relieve the pain.  No midline tenderness crepitus or step-off of the cervical spine.  Equal strength to bilateral upper extremities.  No pronator drift.  Compartments soft.  Cap refill brisk.  Equal pulses  Vital signs are reassuring. If available, previous records reviewed.  EKG sinus bradycardia rate 54 beats per minute.  No evidence of acute ischemia or arrhythmia.    My overall impression is right arm pain, suspect radiculopathy. I  considered, but at this time, do not suspect acute stroke, vertebral fracture subluxation, DVT, compartment syndrome ACS/acute MI.    Discharge Meds/Instructions: Naproxen, Robaxin, and Lidoderm. The diagnosis, treatment plan, instructions for follow-up as well as ED return precautions were discussed. All questions have been answered. This case was discussed with and the patient was independently examined by my attending, Dr. Hudson. JAREK Pruitt FNP-C     Scribe Attestation:   Scribe #1: I performed the above scribed service and the documentation accurately describes the services I performed. I attest to the accuracy of the note.      ED Course as of 01/23/23 1014   Mon Jan 23, 2023   0920 \] [JT]      ED Course User Index  [JT] Maribeth Hudson MD               I, JAREK Pruitt, BRANDON, personally performed the services described in this documentation. All medical record entries made by the scribe were at my direction and in my presence. I have reviewed the chart and agree that the record reflects my personal performance and is accurate and complete.   Clinical Impression:   Final diagnoses:  [M79.601] Arm pain, right (Primary)        ED Disposition Condition    Discharge Stable          ED Prescriptions       Medication Sig Dispense Start Date End Date Auth. Provider    naproxen (NAPROSYN) 500 MG tablet Take 1 tablet (500 mg total) by mouth every 12 (twelve) hours. Do not take any additional NSAIDs while you are taking this medication including (Advil, Aleve, Motrin, Ibuprofen, Mobic\meloxicam, Naprosyn, Toradol, ketoralac, etc.). for 5 days 10 tablet 1/23/2023 1/28/2023 SUMMER Welsh    methocarbamoL (ROBAXIN) 500 MG Tab Take 2 tablets (1,000 mg total) by mouth every 8 (eight) hours as needed (Muscle Spasm/Pain). 18 tablet 1/23/2023 1/26/2023 SUMMER Welsh    LIDOcaine (LIDODERM) 5 % Place 1 patch onto the skin every 24 hours as needed (Pain). Remove & Discard patch within 12 hours or as  directed by MD 5 patch 1/23/2023 1/28/2023 SUMMER Welsh          Follow-up Information       Follow up With Specialties Details Why Contact Info    [facility] Call in 1 day To Schedule an Appointment for Follow-Up [address] [facility] [place] [address]  [phone]      [facility] [place], [place] Emergency Dept Emergency Medicine Go to  If symptoms worsen [address] [facility] [id number] [phone]             SUMMER Welsh  01/23/23 1014

## 2023-11-15 DIAGNOSIS — Z12.31 VISIT FOR SCREENING MAMMOGRAM: Primary | ICD-10-CM

## 2023-11-16 DIAGNOSIS — Z12.31 ENCOUNTER FOR SCREENING MAMMOGRAM FOR MALIGNANT NEOPLASM OF BREAST: Primary | ICD-10-CM

## 2024-09-04 DIAGNOSIS — F17.200 CURRENT SMOKER: Primary | ICD-10-CM

## 2024-09-04 DIAGNOSIS — F10.90 METABOLIC DYSFUNCTION-ASSOCIATED STEATOTIC LIVER DISEASE AND INCREASED ALCOHOL INTAKE (METALD): Primary | ICD-10-CM

## 2024-09-04 DIAGNOSIS — K76.0 METABOLIC DYSFUNCTION-ASSOCIATED STEATOTIC LIVER DISEASE AND INCREASED ALCOHOL INTAKE (METALD): Primary | ICD-10-CM

## 2024-09-04 DIAGNOSIS — Z12.31 VISIT FOR SCREENING MAMMOGRAM: Primary | ICD-10-CM

## 2024-11-19 ENCOUNTER — HOSPITAL ENCOUNTER (OUTPATIENT)
Dept: RADIOLOGY | Facility: HOSPITAL | Age: 64
Discharge: HOME | End: 2024-11-19
Attending: FAMILY MEDICINE
Payer: COMMERCIAL

## 2024-11-19 DIAGNOSIS — F10.90 METABOLIC DYSFUNCTION-ASSOCIATED STEATOTIC LIVER DISEASE AND INCREASED ALCOHOL INTAKE (METALD): ICD-10-CM

## 2024-11-19 DIAGNOSIS — Z12.31 VISIT FOR SCREENING MAMMOGRAM: ICD-10-CM

## 2024-11-19 DIAGNOSIS — K76.0 METABOLIC DYSFUNCTION-ASSOCIATED STEATOTIC LIVER DISEASE AND INCREASED ALCOHOL INTAKE (METALD): ICD-10-CM

## 2024-11-19 DIAGNOSIS — F17.200 CURRENT SMOKER: ICD-10-CM

## 2024-11-19 PROCEDURE — 76981 USE PARENCHYMA: CPT | Mod: TC

## 2024-11-19 PROCEDURE — 71271 CT THORAX LUNG CANCER SCR C-: CPT | Mod: 26,,, | Performed by: RADIOLOGY

## 2024-11-19 PROCEDURE — 77063 BREAST TOMOSYNTHESIS BI: CPT | Mod: 26,,, | Performed by: RADIOLOGY

## 2024-11-19 PROCEDURE — 77067 SCR MAMMO BI INCL CAD: CPT | Mod: 26,,, | Performed by: RADIOLOGY

## 2024-11-19 PROCEDURE — 77063 BREAST TOMOSYNTHESIS BI: CPT | Mod: TC

## 2024-11-19 PROCEDURE — 76981 USE PARENCHYMA: CPT | Mod: 26,,, | Performed by: RADIOLOGY

## 2024-11-19 PROCEDURE — 71271 CT THORAX LUNG CANCER SCR C-: CPT | Mod: TC

## 2024-11-20 ENCOUNTER — TELEPHONE (OUTPATIENT)
Dept: PULMONOLOGY | Facility: CLINIC | Age: 64
End: 2024-11-20
Payer: COMMERCIAL

## 2024-11-21 ENCOUNTER — TELEPHONE (OUTPATIENT)
Dept: PULMONOLOGY | Facility: CLINIC | Age: 64
End: 2024-11-21
Payer: COMMERCIAL

## 2024-11-22 NOTE — NURSING
Lung Cancer Screening Navigation   Intake  Diagnosis to Treat  Date of Assessment: 11/21/24  Cancer Type: LDCT/Incidental Lung Finding  Type of Referral: Internal  Referral Source: Workqueue  Date of Referral: 11/20/24  Initial Nurse Navigator Contact: 11/21/24  Referral to Initial Contact Timeline (days): 1  First Appointment Available: 11/27/24  Appointment Date: 12/11/24  First Available Date vs. Scheduled Date (days): 14  Reason if booked > 7 days after scheduling: Transportation coordination  Referral To:: [facility] (Appointment 12/11/24-Dr. Curtis)    LDCT Navigation  LDCT Category: Lung-RADS Category: 4A - Suspicious - consultation advised - possible next step 3 month LDCT. Small nodular abnormality measuring 3.1 mm in the posterior wall of the bronchus intermedius may represent retained secretions, however a true nodule cannot be completely excluded.   LDCT Date Screened: 11/19/24  LDCT Completed Date: 11/19/24  Was this scan canceled or rescheduled?: No  Interested in smoking cessation?: No  Enroll [email or web address]?: No  Referral To: [facility]   (Appointment 12/11/24-Dr. Curtis)    Treatment  Navigation  Current Status: Lung Nodule-4A-Solid noncalcified juxtapleural nodule measuring 3.2 mm in the right upper lobe (axial image 90 series 4).Small nodular/focal abnormality measuring 3.1 mm in the posterior wall of the bronchus intermedius (axial image 187 series 4).  This could represent and retained secretions however a true endobronchial nodule cannot be excluded.Tiny solid noncalcified 2 mm nodule in the right upper lobe (axial image 217 series 4).Solid noncalcified nodule measuring 4.4 mm in the right lower lobe (axial image 300 series 4).    Referrals  General Referrals: [facility]  Support  Support Systems: Family members  Barriers of Care  Barriers of Care: Transportation  Transportation Barriers: Patient dependant on transportation and cannot be transported from one  appointment to another appointment at a different location on the same day.     Spoke with patient and reviewed lung nodule findings on LDCT scan. Advised patient that a referral to pulmonology is recommended. Patient agreeable to referral and appointment information  given, patient verbalized understanding.

## 2024-12-11 ENCOUNTER — OFFICE VISIT (OUTPATIENT)
Dept: PULMONOLOGY | Facility: CLINIC | Age: 64
End: 2024-12-11
Payer: COMMERCIAL

## 2024-12-11 VITALS
BODY MASS INDEX: 27.4 KG/M2 | DIASTOLIC BLOOD PRESSURE: 71 MMHG | SYSTOLIC BLOOD PRESSURE: 156 MMHG | OXYGEN SATURATION: 99 % | WEIGHT: 164.44 LBS | HEIGHT: 65 IN

## 2024-12-11 DIAGNOSIS — R91.8 PULMONARY NODULES: ICD-10-CM

## 2024-12-11 DIAGNOSIS — R91.1 SOLITARY PULMONARY NODULE: Primary | ICD-10-CM

## 2024-12-11 DIAGNOSIS — J43.8 OTHER EMPHYSEMA: ICD-10-CM

## 2024-12-11 DIAGNOSIS — F17.210 CIGARETTE NICOTINE DEPENDENCE WITHOUT COMPLICATION: ICD-10-CM

## 2024-12-11 PROCEDURE — 99203 OFFICE O/P NEW LOW 30 MIN: CPT | Mod: S$GLB,,, | Performed by: INTERNAL MEDICINE

## 2024-12-11 PROCEDURE — 1160F RVW MEDS BY RX/DR IN RCRD: CPT | Mod: CPTII,S$GLB,, | Performed by: INTERNAL MEDICINE

## 2024-12-11 PROCEDURE — 1159F MED LIST DOCD IN RCRD: CPT | Mod: CPTII,S$GLB,, | Performed by: INTERNAL MEDICINE

## 2024-12-11 PROCEDURE — 3078F DIAST BP <80 MM HG: CPT | Mod: CPTII,S$GLB,, | Performed by: INTERNAL MEDICINE

## 2024-12-11 PROCEDURE — 99999 PR PBB SHADOW E&M-EST. PATIENT-LVL IV: CPT | Mod: PBBFAC,,, | Performed by: INTERNAL MEDICINE

## 2024-12-11 PROCEDURE — 3008F BODY MASS INDEX DOCD: CPT | Mod: CPTII,S$GLB,, | Performed by: INTERNAL MEDICINE

## 2024-12-11 PROCEDURE — 3077F SYST BP >= 140 MM HG: CPT | Mod: CPTII,S$GLB,, | Performed by: INTERNAL MEDICINE

## 2024-12-11 NOTE — PROGRESS NOTES
"Subjective:       Patient ID: Criselda Riggins is a 64 y.o. female.    Chief Complaint: abnormal LDCT     65 yo current smoker who had a CT screen for lung cancer.  Smokes 1/2 ppd.  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  Fatigued.  Not always short of breath.  Walks a lot and does not describe [place].  Occasional heart burn.        Review of Systems   Constitutional:  Positive for fatigue. Negative for fever and weight loss.   Respiratory:  Positive for cough (mild). Negative for hemoptysis, wheezing and use of rescue inhaler.    Cardiovascular:  Negative for chest pain, palpitations and leg swelling.   Genitourinary:  Negative for difficulty urinating.   Endocrine:  Negative for cold intolerance and heat intolerance.    Musculoskeletal:  Negative for arthralgias.   Skin:  Negative for rash.   Gastrointestinal:  Positive for acid reflux (heart burn relieved with Maalox).   Neurological:  Negative for headaches.   Hematological:  Negative for adenopathy.   Psychiatric/Behavioral:  Negative for confusion.        Objective:      Physical Exam   Constitutional: She is oriented to person, place, and time. She appears well-developed and well-nourished.   Musculoskeletal:         General: Normal range of motion.   Neurological: She is alert and oriented to person, place, and time.   Skin: Skin is warm and dry.     Personal Diagnostic Review            12/11/2024     9:20 AM 1/23/2023     8:42 AM 6/23/2022     9:44 AM 8/26/2021     5:30 PM 8/26/2021     2:19 PM 11/17/2020     9:41 AM 1/11/2018     9:43 AM   Pulmonary Function Tests   SpO2 99 % 98 %  97 % 99 %  100 %   Height 5' 5" (1.651 m) 5' 5" (1.651 m) 5' 5" (1.651 m)  5' 5" (1.651 m) 5' 6" (1.676 m)    Weight 74.6 kg (164 lb 7.4 oz) 70.3 kg (155 lb) 72.6 kg (160 lb)  72.6 kg (160 lb) 78 kg (172 lb)    BMI (Calculated) 27.4 25.8 26.6  26.6 27.8          Assessment:       1. Solitary pulmonary nodule    2. Pulmonary nodules  "   3. Cigarette nicotine dependence without complication    4. Other emphysema        Outpatient Encounter Medications as of 12/11/2024   Medication Sig Dispense Refill    atorvastatin (LIPITOR) 20 MG tablet Take 20 mg by mouth nightly.      ondansetron (ZOFRAN) 4 MG tablet Take 1 tablet (4 mg total) by mouth every 8 (eight) hours as needed for Nausea. 12 tablet 0    pantoprazole (PROTONIX) 40 MG tablet TAKE 1 TABLET BY MOUTH ONCE DAILY 30 MINUTES PRIOR TO MEAL      triamterene-hydrochlorothiazide 37.5-25 mg (DYAZIDE) 37.5-25 mg per capsule PLEASE SEE ATTACHED FOR DETAILED DIRECTIONS      triamterene-hydrochlorothiazide 37.5-25 mg (MAXZIDE-25) 37.5-25 mg per tablet Take 1 tablet by mouth once daily.      aluminum-magnesium hydroxide-simethicone (MAALOX ADVANCED) 200-200-20 mg/5 mL Susp Take 15 mLs by mouth 4 (four) times daily before meals and nightly. 150 mL 0    hydrALAZINE (APRESOLINE) 25 MG tablet Take 1 tablet (25 mg total) by mouth 3 (three) times daily. 90 tablet 0    metoprolol tartrate (LOPRESSOR) 25 MG tablet Take 1 tablet (25 mg total) by mouth 2 (two) times daily. 60 tablet 0    ranitidine (ZANTAC) 150 MG capsule Take 1 capsule (150 mg total) by mouth once daily. 15 capsule 0     No facility-administered encounter medications on file as of 12/11/2024.     Orders Placed This Encounter   Procedures    CT Chest Without Contrast     Standing Status:   Future     Standing Expiration Date:   12/11/2025     Order Specific Question:   May the Radiologist modify the order per protocol to meet the clinical needs of the patient?     Answer:   Yes    Ambulatory referral/consult to Smoking Cessation Program     Standing Status:   Future     Standing Expiration Date:   1/11/2026     Referral Priority:   Routine     Referral Type:   Consultation     Referral Reason:   Specialty Services Required     Requested Specialty:   CTTS     Number of Visits Requested:   1     Plan:     Problem List Items Addressed This Visit        Solitary pulmonary nodule - Primary    Overview     Endobronchial.  Short term follow up CT in three months, will call with results.          Relevant Orders    CT Chest Without Contrast    Cigarette nicotine dependence without complication    Overview     Advised on benefits of cessation and placed referral to smoking cessation.          Relevant Orders    Ambulatory referral/consult to Smoking Cessation Program    CT Chest Without Contrast    Other emphysema    Overview     Mild, seen on imaging.  Asymptomatic  Smoking cessation discussed.           Other Visit Diagnoses       Pulmonary nodules

## 2025-03-31 ENCOUNTER — TELEPHONE (OUTPATIENT)
Dept: PULMONOLOGY | Facility: CLINIC | Age: 65
End: 2025-03-31
Payer: COMMERCIAL

## 2025-05-19 ENCOUNTER — TELEPHONE (OUTPATIENT)
Dept: PULMONOLOGY | Facility: CLINIC | Age: 65
End: 2025-05-19
Payer: COMMERCIAL

## 2025-05-29 ENCOUNTER — TELEPHONE (OUTPATIENT)
Dept: PULMONOLOGY | Facility: CLINIC | Age: 65
End: 2025-05-29
Payer: COMMERCIAL

## 2025-05-29 NOTE — TELEPHONE ENCOUNTER
----- Message from Harvey Henderson sent at 5/29/2025  9:15 AM CDT -----  Regarding: appt access  PATIENT RETURNING CALLPt returned Gabriela's call regarding repeat CT scheduling. Please call pt at [phone] to assist, she will also need an estimate of OOP cost bc the last scheduled CT was going to be $600, which is why she cancelled in the first place.

## 2025-05-29 NOTE — TELEPHONE ENCOUNTER
----- Message from Aman Go sent at 5/29/2025  9:35 AM CDT -----  Regarding: FW: appt access    ----- Message -----  From: Harvey Henderson  Sent: 5/29/2025   9:17 AM CDT  To: Kirsten MORROW Staff  Subject: appt access                                      PATIENT RETURNING CALLPt returned Gabriela's call regarding repeat CT scheduling. Please call pt at [phone] to assist, she will also need an estimate of OOP cost bc the last scheduled CT was going to be $600, which is why she cancelled in the first place.

## 2025-06-02 ENCOUNTER — TELEPHONE (OUTPATIENT)
Dept: PULMONOLOGY | Facility: CLINIC | Age: 65
End: 2025-06-02
Payer: COMMERCIAL

## 2025-06-04 ENCOUNTER — HOSPITAL ENCOUNTER (OUTPATIENT)
Dept: RADIOLOGY | Facility: HOSPITAL | Age: 65
Discharge: HOME | End: 2025-06-04
Attending: INTERNAL MEDICINE
Payer: COMMERCIAL

## 2025-06-04 DIAGNOSIS — F17.210 CIGARETTE NICOTINE DEPENDENCE WITHOUT COMPLICATION: ICD-10-CM

## 2025-06-04 DIAGNOSIS — R91.1 SOLITARY PULMONARY NODULE: ICD-10-CM

## 2025-06-04 PROCEDURE — 71250 CT THORAX DX C-: CPT | Mod: TC

## 2025-06-10 ENCOUNTER — TELEPHONE (OUTPATIENT)
Dept: PULMONOLOGY | Facility: CLINIC | Age: 65
End: 2025-06-10
Payer: COMMERCIAL

## 2025-06-10 ENCOUNTER — RESULTS FOLLOW-UP (OUTPATIENT)
Dept: PULMONOLOGY | Facility: CLINIC | Age: 65
End: 2025-06-10

## 2025-06-10 NOTE — TELEPHONE ENCOUNTER
Called and spoke with patient concerning recent screening low dose CT scan.  Results reviewed and informed to follow up with a yearly CT scan in 2026. Importance of yearly follow up  discussed.